# Patient Record
Sex: FEMALE | Race: WHITE | NOT HISPANIC OR LATINO | URBAN - METROPOLITAN AREA
[De-identification: names, ages, dates, MRNs, and addresses within clinical notes are randomized per-mention and may not be internally consistent; named-entity substitution may affect disease eponyms.]

---

## 2021-08-12 ENCOUNTER — OFFICE VISIT (OUTPATIENT)
Dept: FAMILY MEDICINE CLINIC | Facility: CLINIC | Age: 26
End: 2021-08-12
Payer: COMMERCIAL

## 2021-08-12 VITALS
HEIGHT: 62 IN | DIASTOLIC BLOOD PRESSURE: 68 MMHG | SYSTOLIC BLOOD PRESSURE: 106 MMHG | TEMPERATURE: 97.4 F | BODY MASS INDEX: 22.82 KG/M2 | WEIGHT: 124 LBS

## 2021-08-12 DIAGNOSIS — Z13.89 SCREENING FOR GENITOURINARY CONDITION: ICD-10-CM

## 2021-08-12 DIAGNOSIS — Z76.89 ENCOUNTER TO ESTABLISH CARE WITH NEW DOCTOR: ICD-10-CM

## 2021-08-12 DIAGNOSIS — R05.9 COUGH: ICD-10-CM

## 2021-08-12 DIAGNOSIS — Z13.1 SCREENING FOR DIABETES MELLITUS: ICD-10-CM

## 2021-08-12 DIAGNOSIS — Z13.29 SCREENING FOR THYROID DISORDER: ICD-10-CM

## 2021-08-12 DIAGNOSIS — Z13.220 SCREENING, LIPID: ICD-10-CM

## 2021-08-12 DIAGNOSIS — Z13.21 ENCOUNTER FOR VITAMIN DEFICIENCY SCREENING: ICD-10-CM

## 2021-08-12 DIAGNOSIS — Z01.419 VISIT FOR GYNECOLOGIC EXAMINATION: Primary | ICD-10-CM

## 2021-08-12 PROCEDURE — 3008F BODY MASS INDEX DOCD: CPT | Performed by: INTERNAL MEDICINE

## 2021-08-12 PROCEDURE — 3725F SCREEN DEPRESSION PERFORMED: CPT | Performed by: INTERNAL MEDICINE

## 2021-08-12 PROCEDURE — 99204 OFFICE O/P NEW MOD 45 MIN: CPT | Performed by: INTERNAL MEDICINE

## 2021-08-12 PROCEDURE — 1036F TOBACCO NON-USER: CPT | Performed by: INTERNAL MEDICINE

## 2021-08-12 RX ORDER — MULTIVITAMIN
1 TABLET ORAL DAILY
COMMUNITY

## 2021-08-12 NOTE — PROGRESS NOTES
Assessment/Plan:         Diagnoses and all orders for this visit:    Visit for gynecologic examination  -     Ambulatory referral to Gynecology; Future    Screening for diabetes mellitus  -     CBC and differential  -     Comprehensive metabolic panel    Screening, lipid  -     Lipid panel    Screening for thyroid disorder  -     TSH, 3rd generation with Free T4 reflex    Encounter for vitamin deficiency screening  -     Vitamin D 25 hydroxy    Screening for genitourinary condition  -     Urinalysis with microscopic    Encounter to establish care with new doctor    Cough  -     XR chest pa & lateral; Future    Other orders  -     Multiple Vitamin (multivitamin) tablet; Take 1 tablet by mouth daily  -     BIOTIN PO; Take by mouth        Subjective:      Patient ID: Kathleen Mendez is a 22 y o  female  HPI  Patient is here to establish care  She is a pleasant 49-year-old female originally from [place] here to establish care  She has not had a PCP in [facility] but moved from [place]  No recent lab studies  She has 2 children 11and 1year-old and reports no complications with pregnancy  She does mention having a cough for the last 2 to 3 weeks which is dry without any hemoptysis reports no fevers or chills  Denies any shortness of breath  Denies any history of asthma  The following portions of the patient's history were reviewed and updated as appropriate: allergies, current medications, past family history, past medical history, past social history, past surgical history and problem list     Review of Systems   Constitutional: Negative for appetite change, chills, fatigue, fever and unexpected weight change  HENT: Negative for congestion, hearing loss, postnasal drip, trouble swallowing and voice change  Eyes: Negative for pain and visual disturbance  Respiratory: Positive for cough  Negative for chest tightness and shortness of breath      Cardiovascular: Negative for chest pain, palpitations and leg swelling  Gastrointestinal: Negative for abdominal pain, blood in stool, constipation, diarrhea, nausea and vomiting  Endocrine: Negative for cold intolerance, heat intolerance, polydipsia and polyphagia  Genitourinary: Positive for pelvic pain (Patient reports painful intercourse  Would make referral to gyn)  Negative for difficulty urinating, flank pain, frequency and hematuria  Musculoskeletal: Negative for arthralgias, back pain, gait problem, joint swelling and myalgias  Skin: Negative for rash  Neurological: Negative for dizziness, weakness, light-headedness, numbness and headaches  Hematological: Negative for adenopathy  Does not bruise/bleed easily  Psychiatric/Behavioral: Negative for confusion, dysphoric mood and sleep disturbance  Objective:      /68 (BP Location: Right arm, Patient Position: Sitting, Cuff Size: Adult)   Temp (!) 97 4 °F (36 3 °C) (Tympanic)   Ht 5' 2 25" (1 581 m)   Wt 56 2 kg (124 lb)   BMI 22 50 kg/m²          Physical Exam  Constitutional:       General: She is not in acute distress  Appearance: She is well-developed  HENT:      Head: Normocephalic  Mouth/Throat:      Pharynx: No oropharyngeal exudate  Eyes:      General: No scleral icterus  Pupils: Pupils are equal, round, and reactive to light  Neck:      Thyroid: No thyromegaly  Cardiovascular:      Rate and Rhythm: Normal rate and regular rhythm  Heart sounds: Normal heart sounds  No murmur heard  Pulmonary:      Effort: Pulmonary effort is normal  No respiratory distress  Breath sounds: Normal breath sounds  No wheezing or rales  Abdominal:      General: Bowel sounds are normal  There is no distension  Palpations: Abdomen is soft  Tenderness: There is no abdominal tenderness  There is no guarding or rebound  Musculoskeletal:      Right lower leg: No edema  Left lower leg: No edema  Lymphadenopathy:      Cervical: No cervical adenopathy  Skin:     General: Skin is warm  Neurological:      Mental Status: She is alert and oriented to person, place, and time  Cranial Nerves: No cranial nerve deficit  Deep Tendon Reflexes: Reflexes are normal and symmetric  Psychiatric:         Mood and Affect: Mood normal          Behavior: Behavior normal          Thought Content:  Thought content normal          Judgment: Judgment normal

## 2021-08-13 ENCOUNTER — APPOINTMENT (OUTPATIENT)
Dept: LAB | Facility: MEDICAL CENTER | Age: 26
End: 2021-08-13
Payer: COMMERCIAL

## 2021-08-13 LAB
25(OH)D3 SERPL-MCNC: 26.2 NG/ML (ref 30–100)
ALBUMIN SERPL BCP-MCNC: 3.5 G/DL (ref 3.5–5)
ALP SERPL-CCNC: 64 U/L (ref 46–116)
ALT SERPL W P-5'-P-CCNC: 27 U/L (ref 12–78)
ANION GAP SERPL CALCULATED.3IONS-SCNC: 5 MMOL/L (ref 4–13)
AST SERPL W P-5'-P-CCNC: 18 U/L (ref 5–45)
BACTERIA UR QL AUTO: NORMAL /HPF
BASOPHILS # BLD AUTO: 0.03 THOUSANDS/ΜL (ref 0–0.1)
BASOPHILS NFR BLD AUTO: 1 % (ref 0–1)
BILIRUB SERPL-MCNC: 0.56 MG/DL (ref 0.2–1)
BILIRUB UR QL STRIP: NEGATIVE
BUN SERPL-MCNC: 8 MG/DL (ref 5–25)
CALCIUM SERPL-MCNC: 8.7 MG/DL (ref 8.3–10.1)
CHLORIDE SERPL-SCNC: 109 MMOL/L (ref 100–108)
CHOLEST SERPL-MCNC: 142 MG/DL (ref 50–200)
CLARITY UR: CLEAR
CO2 SERPL-SCNC: 24 MMOL/L (ref 21–32)
COLOR UR: YELLOW
CREAT SERPL-MCNC: 0.44 MG/DL (ref 0.6–1.3)
EOSINOPHIL # BLD AUTO: 0.26 THOUSAND/ΜL (ref 0–0.61)
EOSINOPHIL NFR BLD AUTO: 4 % (ref 0–6)
ERYTHROCYTE [DISTWIDTH] IN BLOOD BY AUTOMATED COUNT: 13.1 % (ref 11.6–15.1)
GFR SERPL CREATININE-BSD FRML MDRD: 141 ML/MIN/1.73SQ M
GLUCOSE P FAST SERPL-MCNC: 81 MG/DL (ref 65–99)
GLUCOSE UR STRIP-MCNC: NEGATIVE MG/DL
HCT VFR BLD AUTO: 39.5 % (ref 34.8–46.1)
HDLC SERPL-MCNC: 58 MG/DL
HGB BLD-MCNC: 13 G/DL (ref 11.5–15.4)
HGB UR QL STRIP.AUTO: NEGATIVE
IMM GRANULOCYTES # BLD AUTO: 0.01 THOUSAND/UL (ref 0–0.2)
IMM GRANULOCYTES NFR BLD AUTO: 0 % (ref 0–2)
KETONES UR STRIP-MCNC: NEGATIVE MG/DL
LDLC SERPL CALC-MCNC: 71 MG/DL (ref 0–100)
LEUKOCYTE ESTERASE UR QL STRIP: NEGATIVE
LYMPHOCYTES # BLD AUTO: 1.84 THOUSANDS/ΜL (ref 0.6–4.47)
LYMPHOCYTES NFR BLD AUTO: 31 % (ref 14–44)
MCH RBC QN AUTO: 30.6 PG (ref 26.8–34.3)
MCHC RBC AUTO-ENTMCNC: 32.9 G/DL (ref 31.4–37.4)
MCV RBC AUTO: 93 FL (ref 82–98)
MONOCYTES # BLD AUTO: 0.34 THOUSAND/ΜL (ref 0.17–1.22)
MONOCYTES NFR BLD AUTO: 6 % (ref 4–12)
NEUTROPHILS # BLD AUTO: 3.39 THOUSANDS/ΜL (ref 1.85–7.62)
NEUTS SEG NFR BLD AUTO: 58 % (ref 43–75)
NITRITE UR QL STRIP: NEGATIVE
NON-SQ EPI CELLS URNS QL MICRO: NORMAL /HPF
NONHDLC SERPL-MCNC: 84 MG/DL
NRBC BLD AUTO-RTO: 0 /100 WBCS
PH UR STRIP.AUTO: 8 [PH]
PLATELET # BLD AUTO: 263 THOUSANDS/UL (ref 149–390)
PMV BLD AUTO: 10.5 FL (ref 8.9–12.7)
POTASSIUM SERPL-SCNC: 4.3 MMOL/L (ref 3.5–5.3)
PROT SERPL-MCNC: 7.3 G/DL (ref 6.4–8.2)
PROT UR STRIP-MCNC: NEGATIVE MG/DL
RBC # BLD AUTO: 4.25 MILLION/UL (ref 3.81–5.12)
RBC #/AREA URNS AUTO: NORMAL /HPF
SODIUM SERPL-SCNC: 138 MMOL/L (ref 136–145)
SP GR UR STRIP.AUTO: 1.01 (ref 1–1.03)
TRIGL SERPL-MCNC: 67 MG/DL
TSH SERPL DL<=0.05 MIU/L-ACNC: 3.12 UIU/ML (ref 0.36–3.74)
UROBILINOGEN UR QL STRIP.AUTO: 0.2 E.U./DL
WBC # BLD AUTO: 5.87 THOUSAND/UL (ref 4.31–10.16)
WBC #/AREA URNS AUTO: NORMAL /HPF

## 2021-08-13 PROCEDURE — 84443 ASSAY THYROID STIM HORMONE: CPT | Performed by: INTERNAL MEDICINE

## 2021-08-13 PROCEDURE — 82306 VITAMIN D 25 HYDROXY: CPT | Performed by: INTERNAL MEDICINE

## 2021-08-13 PROCEDURE — 36415 COLL VENOUS BLD VENIPUNCTURE: CPT | Performed by: INTERNAL MEDICINE

## 2021-08-13 PROCEDURE — 80061 LIPID PANEL: CPT | Performed by: INTERNAL MEDICINE

## 2021-08-13 PROCEDURE — 80053 COMPREHEN METABOLIC PANEL: CPT | Performed by: INTERNAL MEDICINE

## 2021-08-13 PROCEDURE — 85025 COMPLETE CBC W/AUTO DIFF WBC: CPT | Performed by: INTERNAL MEDICINE

## 2021-08-13 PROCEDURE — 81001 URINALYSIS AUTO W/SCOPE: CPT | Performed by: INTERNAL MEDICINE

## 2021-08-16 NOTE — RESULT ENCOUNTER NOTE
Chest x-ray is pending  Labs good otherwise except for slightly low vitamin-D    Patient may take over-the-counter vitamin-D 1000 a day regularly

## 2021-08-19 ENCOUNTER — TELEPHONE (OUTPATIENT)
Dept: FAMILY MEDICINE CLINIC | Facility: CLINIC | Age: 26
End: 2021-08-19

## 2021-08-19 NOTE — TELEPHONE ENCOUNTER
----- Message from Mabel Manley MD sent at 8/16/2021  2:56 PM EDT -----    Chest x-ray is pending  Labs good otherwise except for slightly low vitamin-D    Patient may take over-the-counter vitamin-D 1000 a day regularly

## 2022-03-23 ENCOUNTER — OFFICE VISIT (OUTPATIENT)
Dept: OBGYN CLINIC | Facility: CLINIC | Age: 27
End: 2022-03-23
Payer: COMMERCIAL

## 2022-03-23 VITALS
WEIGHT: 134.6 LBS | BODY MASS INDEX: 23.85 KG/M2 | HEIGHT: 63 IN | DIASTOLIC BLOOD PRESSURE: 56 MMHG | SYSTOLIC BLOOD PRESSURE: 96 MMHG

## 2022-03-23 DIAGNOSIS — R10.2 PELVIC PAIN IN FEMALE: Primary | ICD-10-CM

## 2022-03-23 DIAGNOSIS — Z30.431 INTRAUTERINE DEVICE SURVEILLANCE: ICD-10-CM

## 2022-03-23 LAB — SL AMB POCT URINE HCG: NEGATIVE

## 2022-03-23 PROCEDURE — 87491 CHLMYD TRACH DNA AMP PROBE: CPT | Performed by: NURSE PRACTITIONER

## 2022-03-23 PROCEDURE — 81025 URINE PREGNANCY TEST: CPT | Performed by: NURSE PRACTITIONER

## 2022-03-23 PROCEDURE — 3008F BODY MASS INDEX DOCD: CPT | Performed by: NURSE PRACTITIONER

## 2022-03-23 PROCEDURE — 99213 OFFICE O/P EST LOW 20 MIN: CPT | Performed by: NURSE PRACTITIONER

## 2022-03-23 PROCEDURE — 87591 N.GONORRHOEAE DNA AMP PROB: CPT | Performed by: NURSE PRACTITIONER

## 2022-03-23 PROCEDURE — 1036F TOBACCO NON-USER: CPT | Performed by: NURSE PRACTITIONER

## 2022-03-24 LAB
C TRACH DNA SPEC QL NAA+PROBE: NEGATIVE
N GONORRHOEA DNA SPEC QL NAA+PROBE: NEGATIVE

## 2022-04-08 ENCOUNTER — VBI (OUTPATIENT)
Dept: ADMINISTRATIVE | Facility: OTHER | Age: 27
End: 2022-04-08

## 2022-08-15 ENCOUNTER — VBI (OUTPATIENT)
Dept: ADMINISTRATIVE | Facility: OTHER | Age: 27
End: 2022-08-15

## 2022-09-12 ENCOUNTER — RA CDI HCC (OUTPATIENT)
Dept: OTHER | Facility: HOSPITAL | Age: 27
End: 2022-09-12

## 2022-09-12 NOTE — PROGRESS NOTES
[facility]  coding opportunities       Chart reviewed, no opportunity found: CHART REVIEWED, NO OPPORTUNITY FOUND        Patients Insurance        Commercial Insurance: [address] [place] [address]

## 2022-09-13 ENCOUNTER — OFFICE VISIT (OUTPATIENT)
Dept: FAMILY MEDICINE CLINIC | Facility: CLINIC | Age: 27
End: 2022-09-13
Payer: COMMERCIAL

## 2022-09-13 VITALS
OXYGEN SATURATION: 98 % | WEIGHT: 132.6 LBS | TEMPERATURE: 98.7 F | HEART RATE: 92 BPM | RESPIRATION RATE: 16 BRPM | SYSTOLIC BLOOD PRESSURE: 110 MMHG | BODY MASS INDEX: 23.5 KG/M2 | HEIGHT: 63 IN | DIASTOLIC BLOOD PRESSURE: 72 MMHG

## 2022-09-13 DIAGNOSIS — Z11.4 SCREENING FOR HIV (HUMAN IMMUNODEFICIENCY VIRUS): ICD-10-CM

## 2022-09-13 DIAGNOSIS — R10.2 PELVIC PAIN: ICD-10-CM

## 2022-09-13 DIAGNOSIS — N93.9 VAGINAL BLEEDING: ICD-10-CM

## 2022-09-13 DIAGNOSIS — Z00.00 PHYSICAL EXAM: Primary | ICD-10-CM

## 2022-09-13 DIAGNOSIS — Z12.4 SCREENING FOR CERVICAL CANCER: ICD-10-CM

## 2022-09-13 DIAGNOSIS — Z11.59 NEED FOR HEPATITIS C SCREENING TEST: ICD-10-CM

## 2022-09-13 PROCEDURE — 3725F SCREEN DEPRESSION PERFORMED: CPT | Performed by: FAMILY MEDICINE

## 2022-09-13 PROCEDURE — 99395 PREV VISIT EST AGE 18-39: CPT | Performed by: FAMILY MEDICINE

## 2022-09-13 NOTE — PATIENT INSTRUCTIONS
No labs needed this year  Get some labs and urine done at the lab  Patient to call for results if he/she does not hear from us    Refer to gyn    But get ultrasound done first of pelvis    If you consider medication for depression - please call dr Odalis Dalton  Try to find some 'me' time - to do something that you want to do for yourself!!! Ie: a hobbie

## 2022-09-13 NOTE — PROGRESS NOTES
FAMILY PRACTICE OFFICE VISIT    NAME: Kerri Wade    AGE: 32 y o  SEX: female  : 1995   MRN: [id number]    DATE: 2022  TIME: 2:16 PM    Assessment and Plan      1  Screening for cervical cancer  Referral given      2  Physical exam  Anticipatory guidance and preventative medicine discussed  To consider covid booster  To consider flu shot      3  Vaginal bleeding  Refer to gyn  Check urine   Patient to call for results if he/she does not hear from us      4  Screening for HIV (human immunodeficiency virus)      5  Need for hepatitis C screening test    6  Depression  Discussed starting medication  And counseling  Pt declines for now  Denies plans to hurt self/others  Pt to call if she changes her mind  Depression: Not at risk    PHQ-2 Score: 2     Depression Screening Follow-up Plan: Patient's depression screening was positive with a PHQ-2 score of 2  Their PHQ-9 score was 9  Patient declines further evaluation by mental health professional and/or medications  They have no active suicidal ideations  Brief counseling provided and recommend additional follow-up/re-evaluation at next office visit  Pt to return shortterm to f/u in 2m os  Sooner prn      7  Pelvic pain  And vaginal bleeding   US and then visit with gyn      There are no Patient Instructions on file for this visit  Discussed consideration of med for depression and/or counseling - pt declines for now  Patient Instructions   No labs needed this year  Get some labs and urine done at the lab  Patient to call for results if he/she does not hear from us    Refer to gyn    But get ultrasound done first of pelvis    If you consider medication for depression - please call dr Dean Enciso  Try to find some 'me' time - to do something that you want to do for yourself!!! Ie: a hobbie                Chief Complaint     Chief Complaint   Patient presents with    Establish Care       History of Present Illness   Amaya Wade is a 32 y o -year-old female who presents today for routine visit  To get established  New patient today  Pt would like to see gyn  Has an IUD  Review of Systems   Review of Systems   Constitutional: Negative for chills, diaphoresis, fatigue, fever and unexpected weight change  Respiratory: Negative for cough, shortness of breath and wheezing  Cardiovascular: Negative for chest pain and palpitations  Gastrointestinal: Negative for abdominal pain, blood in stool, constipation, diarrhea, nausea and vomiting  Genitourinary: Positive for vaginal bleeding  Negative for dysuria and vaginal discharge   - 2 children - 10 yo and a 3 yo  Notices some blood on underwear most days - not sure if coming from vagina or urinary tract    Last menses X 2 weeks ago  Has some left lower quadrant/left pelvic pain - no h/o ovarian cysts or uterine fibroids  Psychiatric/Behavioral: Positive for dysphoric mood and sleep disturbance  Negative for self-injury and suicidal ideas  The patient is not nervous/anxious  Feels tired and depressed at times  Has not had treatment in past and does not desire meds at this time  Sleep is not great  Wakes several times  Does exercise  Depression score is (+)  Denies suicidal ideations - does not plan on hurting herself  Pt is home full time             Active Problem List   There is no problem list on file for this patient  Past Medical History:  History reviewed  No pertinent past medical history  Past Surgical History:  History reviewed  No pertinent surgical history      Family History:  Family History   Problem Relation Age of Onset    No Known Problems Mother     No Known Problems Father     No Known Problems Sister     No Known Problems Brother        Social History:  Social History     Socioeconomic History    Marital status: /Civil Union     Spouse name: Not on file    Number of children: Not on file    Years of education: Not on file   Liseth Waldron Highest education level: Not on file   Occupational History    Not on file   Tobacco Use    Smoking status: Never Smoker    Smokeless tobacco: Never Used   Vaping Use    Vaping Use: Never used   Substance and Sexual Activity    Alcohol use: Not Currently    Drug use: Never    Sexual activity: Yes     Partners: Male     Birth control/protection: I U D  Other Topics Concern    Not on file   Social History Narrative    Not on file     Social Determinants of Health     Financial Resource Strain: Not on file   Food Insecurity: Not on file   Transportation Needs: Not on file   Physical Activity: Not on file   Stress: Not on file   Social Connections: Not on file   Intimate Partner Violence: Not on file   Housing Stability: Not on file       Objective     Vitals:    09/13/22 1407   BP: 110/72   Pulse: 92   Resp: 16   Temp: 98 7 °F (37 1 °C)   SpO2: 98%     Wt Readings from Last 3 Encounters:   09/13/22 60 1 kg (132 lb 9 6 oz)   03/23/22 61 1 kg (134 lb 9 6 oz)   08/12/21 56 2 kg (124 lb)       Physical Exam  Vitals and nursing note reviewed  Constitutional:       General: She is not in acute distress  Appearance: Normal appearance  She is not ill-appearing or toxic-appearing  HENT:      Right Ear: Tympanic membrane normal       Left Ear: Tympanic membrane normal       Nose: Nose normal  No congestion  Mouth/Throat:      Mouth: Mucous membranes are moist       Pharynx: No oropharyngeal exudate or posterior oropharyngeal erythema  Eyes:      General: No scleral icterus  Extraocular Movements: Extraocular movements intact  Pupils: Pupils are equal, round, and reactive to light  Neck:      Vascular: No carotid bruit  Comments: No thyromegaly  Cardiovascular:      Rate and Rhythm: Normal rate and regular rhythm  Pulses: Normal pulses  Heart sounds: Normal heart sounds  No murmur heard  Pulmonary:      Effort: Pulmonary effort is normal  No respiratory distress        Breath sounds: Normal breath sounds  No wheezing, rhonchi or rales  Abdominal:      General: Abdomen is flat  There is no distension  Palpations: Abdomen is soft  There is no mass  Tenderness: There is abdominal tenderness  There is no guarding or rebound  Comments: Mild left lower pelvic pain  No masses  Or peritoneal signs     Musculoskeletal:      Cervical back: Neck supple  Right lower leg: No edema  Left lower leg: No edema  Lymphadenopathy:      Cervical: No cervical adenopathy  Skin:     General: Skin is warm  Coloration: Skin is not jaundiced  Neurological:      General: No focal deficit present  Mental Status: She is alert and oriented to person, place, and time  Psychiatric:         Mood and Affect: Mood normal          Behavior: Behavior normal          Thought Content: Thought content normal          Judgment: Judgment normal       Comments: Denies active SI  Mood is very appropriate and pleasant  Not teary eyed             Pertinent Laboratory/Diagnostic Studies:  Lab Results   Component Value Date    BUN 8 08/13/2021    CREATININE 0 44 (L) 08/13/2021    CALCIUM 8 7 08/13/2021    K 4 3 08/13/2021    CO2 24 08/13/2021     (H) 08/13/2021     Lab Results   Component Value Date    ALT 27 08/13/2021    AST 18 08/13/2021    ALKPHOS 64 08/13/2021       Lab Results   Component Value Date    WBC 5 87 08/13/2021    HGB 13 0 08/13/2021    HCT 39 5 08/13/2021    MCV 93 08/13/2021     08/13/2021       No results found for: TSH    No results found for: CHOL  Lab Results   Component Value Date    TRIG 67 08/13/2021     Lab Results   Component Value Date    HDL 58 08/13/2021     Lab Results   Component Value Date    LDLCALC 71 08/13/2021     Lab Results   Component Value Date    HGBA1C 5 0 09/23/2020       Results for orders placed or performed in visit on 03/23/22   Chlamydia/GC amplified DNA by PCR    Specimen: Endocervical; Genital   Result Value Ref Range    N gonorrhoeae, DNA Probe Negative Negative    Chlamydia trachomatis, DNA Probe Negative Negative   POCT urine HCG   Result Value Ref Range    URINE HCG negative        No orders of the defined types were placed in this encounter  ALLERGIES:  No Known Allergies    Current Medications     Current Outpatient Medications   Medication Sig Dispense Refill    Multiple Vitamin (multivitamin) tablet Take 1 tablet by mouth daily      Multiple Vitamins-Minerals (HAIR VITAMINS PO) Take by mouth      BIOTIN PO Take by mouth (Patient not taking: Reported on 9/13/2022)       No current facility-administered medications for this visit           Health Maintenance     Health Maintenance   Topic Date Due    Hepatitis C Screening  Never done    HPV Vaccine (1 - 2-dose series) Never done    HIV Screening  Never done    Annual Physical  Never done    Cervical Cancer Screening  Never done    COVID-19 Vaccine (3 - Booster for Pfizer series) 10/01/2021    Depression Screening  08/12/2022    Influenza Vaccine (1) 09/01/2022    BMI: Adult  03/23/2023    DTaP,Tdap,and Td Vaccines (2 - Td or Tdap) 04/05/2029    Pneumococcal Vaccine: Pediatrics (0 to 5 Years) and At-Risk Patients (6 to 59 Years)  Aged Out    HIB Vaccine  Aged Out    Hepatitis B Vaccine  Aged Out    IPV Vaccine  Aged Out    Hepatitis A Vaccine  Aged Out    Meningococcal ACWY Vaccine  Aged [employer] History   Administered Date(s) Administered    COVID-19 PFIZER VACCINE 0 3 ML IM 04/12/2021, 05/01/2021    Tdap 04/05/2019          Raisa Cheung DO

## 2022-10-05 ENCOUNTER — HOSPITAL ENCOUNTER (OUTPATIENT)
Dept: ULTRASOUND IMAGING | Facility: HOSPITAL | Age: 27
Discharge: HOME | End: 2022-10-05
Attending: FAMILY MEDICINE
Payer: COMMERCIAL

## 2022-10-05 DIAGNOSIS — N93.9 VAGINAL BLEEDING: ICD-10-CM

## 2022-10-05 DIAGNOSIS — R10.2 PELVIC PAIN: ICD-10-CM

## 2022-10-05 PROCEDURE — 76856 US EXAM PELVIC COMPLETE: CPT

## 2022-10-05 PROCEDURE — 76830 TRANSVAGINAL US NON-OB: CPT

## 2022-10-05 NOTE — RESULT ENCOUNTER NOTE
[facility] -   Your ultrasound is normal  Shows good position of IUD as well  Did you schedule gyn appt?  - thanks  Dr Alessandra Orellana

## 2022-11-15 ENCOUNTER — OFFICE VISIT (OUTPATIENT)
Dept: FAMILY MEDICINE CLINIC | Facility: CLINIC | Age: 27
End: 2022-11-15

## 2022-11-15 VITALS
BODY MASS INDEX: 23.5 KG/M2 | WEIGHT: 132.6 LBS | SYSTOLIC BLOOD PRESSURE: 110 MMHG | OXYGEN SATURATION: 97 % | DIASTOLIC BLOOD PRESSURE: 70 MMHG | HEIGHT: 63 IN | HEART RATE: 83 BPM

## 2022-11-15 DIAGNOSIS — F33.9 DEPRESSION, RECURRENT (HCC): Primary | ICD-10-CM

## 2022-11-15 DIAGNOSIS — Z11.59 NEED FOR HEPATITIS C SCREENING TEST: ICD-10-CM

## 2022-11-15 DIAGNOSIS — L65.9 HAIR LOSS: ICD-10-CM

## 2022-11-15 DIAGNOSIS — Z12.4 CERVICAL CANCER SCREENING: ICD-10-CM

## 2022-11-15 NOTE — PROGRESS NOTES
FAMILY PRACTICE OFFICE VISIT    NAME: Kerri Wade    AGE: 32 y o  SEX: female  : 1995   MRN: [id number]    DATE: 11/15/2022  TIME: 2:41 PM    Assessment and Plan     1  Cervical cancer screening  Refer to gyn    - Ambulatory Referral to Gynecology; Future    2  Depression, recurrent ([facility] [address] )  Seems resolved  PHQ - normal today  No need for medication      3  Need for hepatitis C screening test    - Hepatitis C antibody; Future    4  Hair loss  Suspect normal telium eflluvium    - TSH, 3rd generation; Future    Reviewed pt's studies for DUB - which has resolved  Will send to gyn for pap and IUD care    Patient Instructions   Can get a nonfasting hepatitis C and TSH blood test  Patient to call for results if he/she does not hear from us    Recommend a multi-vitamin daily and a vit D 3 supplement  Consider biotin for hair loss  Chief Complaint     Chief Complaint   Patient presents with   • Follow-up       History of Present Illness   Kerri Wade is a 32y o -year-old female who presents today for shortterm f/u visit  At prior visit - pt stated she had abnormal vaginal bleeding  It has since stopped  Has an IUD  And had normal Us      Review of Systems   Review of Systems   Constitutional:        Keeping active  Respiratory: Negative for cough, shortness of breath and wheezing  Cardiovascular: Negative for chest pain and palpitations  Skin:        Has noticed some hair loss     Psychiatric/Behavioral: Negative for dysphoric mood, self-injury, sleep disturbance and suicidal ideas  The patient is not nervous/anxious  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 12 yo and 2 yo       Active Problem List   There is no problem list on file for this patient  Past Medical History:  History reviewed  No pertinent past medical history  Past Surgical History:  History reviewed  No pertinent surgical history      Family History:  Family History   Problem Relation Age of Onset   • No Known Problems Mother    • No Known Problems Father    • No Known Problems Sister    • No Known Problems Brother        Social History:  Social History     Socioeconomic History   • Marital status: /Civil Union     Spouse name: Not on file   • Number of children: Not on file   • Years of education: Not on file   • Highest education level: Not on file   Occupational History   • Not on file   Tobacco Use   • Smoking status: Never Smoker   • Smokeless tobacco: Never Used   Vaping Use   • Vaping Use: Never used   Substance and Sexual Activity   • Alcohol use: Not Currently   • Drug use: Never   • Sexual activity: Yes     Partners: Male     Birth control/protection: I U D  Other Topics Concern   • Not on file   Social History Narrative   • Not on file     Social Determinants of Health     Financial Resource Strain: Not on file   Food Insecurity: Not on file   Transportation Needs: Not on file   Physical Activity: Not on file   Stress: Not on file   Social Connections: Not on file   Intimate Partner Violence: Not on file   Housing Stability: Not on file       Objective     Vitals:    11/15/22 1400   BP: 110/70   Pulse: 83   SpO2: 97%     Wt Readings from Last 3 Encounters:   11/15/22 60 1 kg (132 lb 9 6 oz)   09/13/22 60 1 kg (132 lb 9 6 oz)   03/23/22 61 1 kg (134 lb 9 6 oz)       Physical Exam  Vitals and nursing note reviewed  Constitutional:       General: She is not in acute distress  Appearance: Normal appearance  She is not ill-appearing or toxic-appearing  Neck:      Comments: No thyromegaly  Cardiovascular:      Rate and Rhythm: Normal rate and regular rhythm  Pulses: Normal pulses  Heart sounds: Normal heart sounds  No murmur heard  Pulmonary:      Effort: Pulmonary effort is normal  No respiratory distress  Breath sounds: Normal breath sounds  No wheezing, rhonchi or rales     Skin:     Comments: No gross hair loss  No patches of alopecia   Neurological:      General: No focal deficit present  Mental Status: She is alert and oriented to person, place, and time  Psychiatric:         Mood and Affect: Mood normal          Behavior: Behavior normal          Thought Content: Thought content normal          Judgment: Judgment normal          Pertinent Laboratory/Diagnostic Studies:  Lab Results   Component Value Date    BUN 8 08/13/2021    CREATININE 0 44 (L) 08/13/2021    CALCIUM 8 7 08/13/2021    K 4 3 08/13/2021    CO2 24 08/13/2021     (H) 08/13/2021     Lab Results   Component Value Date    ALT 27 08/13/2021    AST 18 08/13/2021    ALKPHOS 64 08/13/2021       Lab Results   Component Value Date    WBC 5 87 08/13/2021    HGB 13 0 08/13/2021    HCT 39 5 08/13/2021    MCV 93 08/13/2021     08/13/2021       No results found for: TSH    No results found for: CHOL  Lab Results   Component Value Date    TRIG 67 08/13/2021     Lab Results   Component Value Date    HDL 58 08/13/2021     Lab Results   Component Value Date    LDLCALC 71 08/13/2021     Lab Results   Component Value Date    HGBA1C 5 0 09/23/2020       Results for orders placed or performed in visit on 03/23/22   Chlamydia/GC amplified DNA by PCR    Specimen: Endocervical; Genital   Result Value Ref Range    N gonorrhoeae, DNA Probe Negative Negative    Chlamydia trachomatis, DNA Probe Negative Negative   POCT urine HCG   Result Value Ref Range    URINE HCG negative        No orders of the defined types were placed in this encounter  ALLERGIES:  No Known Allergies    Current Medications     Current Outpatient Medications   Medication Sig Dispense Refill   • Multiple Vitamin (multivitamin) tablet Take 1 tablet by mouth daily     • Multiple Vitamins-Minerals (HAIR VITAMINS PO) Take by mouth       No current facility-administered medications for this visit           Health Maintenance     Health Maintenance   Topic Date Due   • Hepatitis C Screening  Never done   • HPV Vaccine (1 - 2-dose series) Never done   • HIV Screening Never done   • Cervical Cancer Screening  Never done   • COVID-19 Vaccine (3 - Booster for Pfizer series) 10/01/2021   • Influenza Vaccine (1) Never done   • Depression Screening  09/13/2023   • Annual Physical  09/13/2023   • BMI: Adult  11/15/2023   • DTaP,Tdap,and Td Vaccines (2 - Td or Tdap) 04/05/2029   • Pneumococcal Vaccine: Pediatrics (0 to 5 Years) and At-Risk Patients (6 to 59 Years)  Aged Out   • HIB Vaccine  Aged Out   • Hepatitis B Vaccine  Aged Out   • IPV Vaccine  Aged Out   • Hepatitis A Vaccine  Aged Out   • Meningococcal ACWY Vaccine  Aged [employer] History   Administered Date(s) Administered   • COVID-19 PFIZER VACCINE 0 3 ML IM 04/12/2021, 05/01/2021   • Tdap 04/05/2019          Ole Hinton DO

## 2022-11-15 NOTE — PATIENT INSTRUCTIONS
Can get a nonfasting hepatitis C and TSH blood test  Patient to call for results if he/she does not hear from us    Recommend a multi-vitamin daily and a vit D 3 supplement  Consider biotin for hair loss

## 2022-11-21 ENCOUNTER — LAB (OUTPATIENT)
Dept: LAB | Facility: MEDICAL CENTER | Age: 27
End: 2022-11-21

## 2022-11-21 DIAGNOSIS — Z11.4 SCREENING FOR HIV (HUMAN IMMUNODEFICIENCY VIRUS): ICD-10-CM

## 2022-11-21 DIAGNOSIS — L65.9 HAIR LOSS: ICD-10-CM

## 2022-11-21 DIAGNOSIS — Z11.59 NEED FOR HEPATITIS C SCREENING TEST: ICD-10-CM

## 2022-11-21 LAB
BACTERIA UR QL AUTO: ABNORMAL /HPF
BILIRUB UR QL STRIP: NEGATIVE
CLARITY UR: CLEAR
COLOR UR: ABNORMAL
GLUCOSE UR STRIP-MCNC: NEGATIVE MG/DL
HCV AB SER QL: NORMAL
HGB UR QL STRIP.AUTO: ABNORMAL
KETONES UR STRIP-MCNC: NEGATIVE MG/DL
LEUKOCYTE ESTERASE UR QL STRIP: ABNORMAL
NITRITE UR QL STRIP: NEGATIVE
NON-SQ EPI CELLS URNS QL MICRO: ABNORMAL /HPF
PH UR STRIP.AUTO: 6 [PH]
PROT UR STRIP-MCNC: NEGATIVE MG/DL
RBC #/AREA URNS AUTO: ABNORMAL /HPF
SP GR UR STRIP.AUTO: 1.01 (ref 1–1.03)
TSH SERPL DL<=0.05 MIU/L-ACNC: 1.44 UIU/ML (ref 0.45–4.5)
UROBILINOGEN UR STRIP-ACNC: <2 MG/DL
WBC #/AREA URNS AUTO: ABNORMAL /HPF

## 2022-11-22 LAB — HIV 1+2 AB+HIV1 P24 AG SERPL QL IA: NORMAL

## 2022-11-22 NOTE — RESULT ENCOUNTER NOTE
[facility] -   Your hiv is negative    Hep C is negative  TSH normal  Urine is abnormal  Do you have your period or any signs of urinary tract infection  (I sent you a note earlier as well)  - dr Esparza Postin

## 2022-11-27 NOTE — PROGRESS NOTES
Subjective      Rhoda Wade is a 32 y o  female who presents for annual well woman exam   Last Pap smear 7/3/19 NILM  Periods are regular every 28-30 days, lasting 7 days  No intermenstrual bleeding, spotting, or discharge  paragard IUD inserted 2019  Recent pelvic US 10/5/22 resulted with IUD in correct position  Current contraception: IUD  History of abnormal Pap smear Yes ASCUS   Family history of uterine or ovarian cancer: no  Regular self breast exam: no  History of abnormal mammogram:  To begin at age 36 unless clinically indicated  Family history of breast cancer: no  History of abnormal lipids: no  Gardasil vaccine: no      Menstrual History:  OB History        2    Para   2    Term   2       0    AB   0    Living   2       SAB        IAB        Ectopic        Multiple        Live Births   2                Menarche age: unsure   Patient's last menstrual period was 2022 (approximate)  Period Cycle (Days):  (monthly)  Period Duration (Days): 7  Period Pattern: Regular  Menstrual Flow: Moderate  Dysmenorrhea: None    The following portions of the patient's history were reviewed and updated as appropriate: allergies, current medications, past family history, past medical history, past social history, past surgical history and problem list     Review of Systems  Pertinent items are noted in HPI        Objective      /60   Ht 5' 3" (1 6 m)   Wt 59 4 kg (131 lb)   LMP 2022 (Approximate)   BMI 23 21 kg/m²     General:   alert and oriented, in no acute distress, alert, appears stated age and cooperative   Heart: regular rate and rhythm, S1, S2 normal, no murmur, click, rub or gallop   Lungs: clear to auscultation bilaterally   Abdomen: soft, non-tender, without masses or organomegaly   Vulva: normal, Bartholin's, Urethra, [place]'s normal   Vagina: normal mucosa, normal discharge, no palpable nodules   Cervix: no bleeding following Pap, no cervical motion tenderness, no lesions and ParaGard IUD string easily visualized on speculum exam approximately 2 cm   Uterus: normal size, non-tender, normal shape and consistency   Adnexa: normal adnexa and no mass, fullness, tenderness   Breast: breasts appear normal, no suspicious masses, no skin or nipple changes or axillary nodes  Assessment      @well woman@   Plan      All questions answered  Await pap smear results  Breast self exam technique reviewed and patient encouraged to perform self-exam monthly  Contraception: IUD  Diagnosis explained in detail, including differential   Dietary diary  Discussed healthy lifestyle modifications  Educational material distributed  Follow up in 1 Year for annual exam   Follow up as needed  Thin prep Pap smear  Breast awareness reviewed   Encouraged healthy diet, exercise and lifestyle  Encouraged follow-up with PCP as needed  Encouraged seasonal influenza vaccination  Gardasil vaccine series reviewed  Written information provided  Encouraged social distancing, good hand hygiene, avoidance of crowds and masking    Written information provided about COVID-19    Will call / [employer] message with results

## 2022-11-29 ENCOUNTER — OFFICE VISIT (OUTPATIENT)
Dept: OBGYN CLINIC | Facility: CLINIC | Age: 27
End: 2022-11-29

## 2022-11-29 VITALS
HEIGHT: 63 IN | BODY MASS INDEX: 23.21 KG/M2 | SYSTOLIC BLOOD PRESSURE: 102 MMHG | DIASTOLIC BLOOD PRESSURE: 60 MMHG | WEIGHT: 131 LBS

## 2022-11-29 DIAGNOSIS — Z12.4 CERVICAL CANCER SCREENING: ICD-10-CM

## 2022-11-29 DIAGNOSIS — Z30.431 IUD CHECK UP: ICD-10-CM

## 2022-11-29 DIAGNOSIS — Z01.419 WELL WOMAN EXAM WITH ROUTINE GYNECOLOGICAL EXAM: Primary | ICD-10-CM

## 2022-11-29 PROBLEM — F33.9 DEPRESSION, RECURRENT (HCC): Status: RESOLVED | Noted: 2022-11-15 | Resolved: 2022-11-29

## 2022-12-02 ENCOUNTER — VBI (OUTPATIENT)
Dept: ADMINISTRATIVE | Facility: OTHER | Age: 27
End: 2022-12-02

## 2022-12-06 LAB
LAB AP GYN PRIMARY INTERPRETATION: NORMAL
LAB AP LMP: NORMAL
Lab: NORMAL

## 2022-12-15 ENCOUNTER — VBI (OUTPATIENT)
Dept: ADMINISTRATIVE | Facility: OTHER | Age: 27
End: 2022-12-15

## 2023-02-08 ENCOUNTER — TELEPHONE (OUTPATIENT)
Dept: OBGYN CLINIC | Facility: MEDICAL CENTER | Age: 28
End: 2023-02-08

## 2023-02-09 ENCOUNTER — PROCEDURE VISIT (OUTPATIENT)
Dept: OBGYN CLINIC | Facility: MEDICAL CENTER | Age: 28
End: 2023-02-09

## 2023-02-09 VITALS
HEIGHT: 63 IN | SYSTOLIC BLOOD PRESSURE: 118 MMHG | WEIGHT: 129 LBS | DIASTOLIC BLOOD PRESSURE: 76 MMHG | BODY MASS INDEX: 22.86 KG/M2

## 2023-02-09 DIAGNOSIS — Z30.432 ENCOUNTER FOR IUD REMOVAL: Primary | ICD-10-CM

## 2023-02-09 NOTE — PROGRESS NOTES
Iud removal    Date/Time: 2/9/2023 8:34 AM  Performed by: Don Manning MD  Authorized by: Don Manning MD   Universal Protocol:  Consent: Verbal consent obtained  Written consent obtained    Risks and benefits: risks, benefits and alternatives were discussed  Consent given by: patient  Patient understanding: patient states understanding of the procedure being performed  Patient consent: the patient's understanding of the procedure matches consent given  Procedure consent: procedure consent matches procedure scheduled  Patient identity confirmed: verbally with patient      Procedure:     Removed with no complications: yes      Other reason for removal:  Desires pregnancy

## 2023-03-10 DIAGNOSIS — N92.6 MISSED MENSES: Primary | ICD-10-CM

## 2023-03-17 ENCOUNTER — TELEMEDICINE (OUTPATIENT)
Dept: FAMILY MEDICINE CLINIC | Facility: CLINIC | Age: 28
End: 2023-03-17

## 2023-03-17 DIAGNOSIS — J02.0 PHARYNGITIS DUE TO STREPTOCOCCUS SPECIES: Primary | ICD-10-CM

## 2023-03-17 DIAGNOSIS — R31.29 MICROSCOPIC HEMATURIA: Primary | ICD-10-CM

## 2023-03-17 RX ORDER — AMOXICILLIN AND CLAVULANATE POTASSIUM 875; 125 MG/1; MG/1
1 TABLET, FILM COATED ORAL EVERY 12 HOURS SCHEDULED
Qty: 14 TABLET | Refills: 0 | Status: SHIPPED | OUTPATIENT
Start: 2023-03-17 | End: 2023-03-24

## 2023-03-17 NOTE — PROGRESS NOTES
Virtual Regular Visit    Verification of patient location:    Patient is located in the following state in which I hold an active license [place]      Assessment/Plan:    Problem List Items Addressed This Visit    None  Visit Diagnoses     Pharyngitis due to Streptococcus species    -  Primary    Relevant Medications    amoxicillin-clavulanate (Augmentin) 875-125 mg per tablet      Clinical presentation may be consistent with pharyngitis, will start Augmentin 875 mg twice daily for 7 days  She was also asked to do rapid COVID-19 test just in case, she will update me if it will be positive  Reason for visit is   Chief Complaint   Patient presents with   • Virtual Brief Visit        Encounter provider Hazel Apple MD    [address] [place] [facility] [address] [phone]      Recent Visits  No visits were found meeting these conditions  Showing recent visits within past 7 days and meeting all other requirements  Today's Visits  Date Type Provider Dept   03/17/23 Telemedicine Hazel Apple MD [place] [address] Primary Care   Showing today's visits and meeting all other requirements  Future Appointments  No visits were found meeting these conditions  Showing future appointments within next 150 days and meeting all other requirements       The patient was identified by name and date of birth  Gerald Guerrero was informed that this is a telemedicine visit and that the visit is being conducted through Telephone  My office door was closed  No one else was in the room  She acknowledged consent and understanding of privacy and security of the video platform  The patient has agreed to participate and understands they can discontinue the visit at any time  Patient is aware this is a billable service  Subjective  Huseyin Wade is a 32 y o  female         Patient called today with complains of a pain in her throat and white spots on it  No cough, no current fevers  Past Medical History:   Diagnosis Date   • Abnormal Pap smear of cervix        Past Surgical History:   Procedure Laterality Date   • NO PAST SURGERIES         Current Outpatient Medications   Medication Sig Dispense Refill   • amoxicillin-clavulanate (Augmentin) 875-125 mg per tablet Take 1 tablet by mouth every 12 (twelve) hours for 7 days 14 tablet 0   • Multiple Vitamin (multivitamin) tablet Take 1 tablet by mouth daily     • Multiple Vitamins-Minerals (HAIR VITAMINS PO) Take by mouth       No current facility-administered medications for this visit  No Known Allergies    Review of Systems   Constitutional: Negative for chills and fever  HENT: Positive for congestion and sore throat  Respiratory: Negative for cough, shortness of breath and wheezing  Gastrointestinal: Negative for diarrhea, nausea and vomiting  Musculoskeletal: Negative for arthralgias and myalgias  Psychiatric/Behavioral: Negative for confusion  Video Exam    There were no vitals filed for this visit      Physical Exam     I spent 15 minutes directly with the patient during this visit

## 2023-05-09 ENCOUNTER — OFFICE VISIT (OUTPATIENT)
Dept: FAMILY MEDICINE CLINIC | Facility: CLINIC | Age: 28
End: 2023-05-09

## 2023-05-09 ENCOUNTER — LAB (OUTPATIENT)
Dept: LAB | Facility: MEDICAL CENTER | Age: 28
End: 2023-05-09

## 2023-05-09 VITALS
OXYGEN SATURATION: 99 % | HEART RATE: 78 BPM | HEIGHT: 63 IN | SYSTOLIC BLOOD PRESSURE: 104 MMHG | WEIGHT: 130.8 LBS | TEMPERATURE: 98.1 F | BODY MASS INDEX: 23.18 KG/M2 | DIASTOLIC BLOOD PRESSURE: 68 MMHG

## 2023-05-09 DIAGNOSIS — N92.6 MISSED MENSES: ICD-10-CM

## 2023-05-09 DIAGNOSIS — L65.9 HAIR LOSS: ICD-10-CM

## 2023-05-09 DIAGNOSIS — L65.9 HAIR LOSS: Primary | ICD-10-CM

## 2023-05-09 LAB
B-HCG SERPL-ACNC: ABNORMAL MIU/ML
ERYTHROCYTE [DISTWIDTH] IN BLOOD BY AUTOMATED COUNT: 13.2 % (ref 11.6–15.1)
HCT VFR BLD AUTO: 37.7 % (ref 34.8–46.1)
HGB BLD-MCNC: 12.3 G/DL (ref 11.5–15.4)
MCH RBC QN AUTO: 30 PG (ref 26.8–34.3)
MCHC RBC AUTO-ENTMCNC: 32.6 G/DL (ref 31.4–37.4)
MCV RBC AUTO: 92 FL (ref 82–98)
PLATELET # BLD AUTO: 286 THOUSANDS/UL (ref 149–390)
PMV BLD AUTO: 10.9 FL (ref 8.9–12.7)
RBC # BLD AUTO: 4.1 MILLION/UL (ref 3.81–5.12)
TSH SERPL DL<=0.05 MIU/L-ACNC: 1.74 UIU/ML (ref 0.45–4.5)
WBC # BLD AUTO: 10.25 THOUSAND/UL (ref 4.31–10.16)

## 2023-05-09 NOTE — PROGRESS NOTES
FAMILY PRACTICE OFFICE VISIT    NAME: Kerri Wade    AGE: 32 y o  SEX: female  : 1995   MRN: [id number]    DATE: 2023  TIME: 1:08 PM    Assessment and Plan   1  Hair loss  Will begin with labs  Discussed that if bloodwork normal and without any other red flags on hx and PE - suspect telium effluvium (natural course of hair loss - that I would expect pt to start noticing improvement soon - as ongoing now X about 2 mos)  If however, labs normal and hair loss continues - may need derm input     - CBC and Platelet; Future  - TSH, 3rd generation; Future    2  Missed menses  Check bhcg today! Patient to call for results if he/she does not hear from us    - hCG, quantitative; Future    Avoid use of rx or otc meds until results of pregnancy test known  Chief Complaint   No chief complaint on file  History of Present Illness   Salina Wade is a 32y o -year-old female who presents today with complaints of hair loss  Noticed after the birth of her son X 4 years ago - and then went back to normal  And then again X 2mos ago - noticing a large amount of hair loss  No h/o taking thyroid medication  Took biotin for a couple of mos without success  No females in family with hair loss  Father had hair loss  Pt does not color or straighten hair  No recent trauma or stressors  Has 2 children and are doing well  Not working outside the home    Review of Systems   Review of Systems   Constitutional: Negative for fatigue  Is exercising     Respiratory: Negative for cough, shortness of breath and wheezing  Cardiovascular: Negative for chest pain, palpitations and leg swelling  Gastrointestinal: Negative for constipation and diarrhea  Eating well and drinking water     Genitourinary:        Menses - overdue  Pt not sure when it was due exactly  No ocp       Skin:        Denies dry skin  No bald patches     Psychiatric/Behavioral: Negative for dysphoric mood, self-injury, sleep disturbance and suicidal ideas  The patient is not nervous/anxious  Active Problem List     Patient Active Problem List   Diagnosis   • IUD check up         Past Medical History:  Past Medical History:   Diagnosis Date   • Abnormal Pap smear of cervix        Past Surgical History:  Past Surgical History:   Procedure Laterality Date   • NO PAST SURGERIES         Family History:  Family History   Problem Relation Age of Onset   • No Known Problems Mother    • No Known Problems Father    • No Known Problems Sister    • No Known Problems Sister    • No Known Problems Sister    • No Known Problems Sister    • No Known Problems Brother    • No Known Problems Brother    • No Known Problems Son    • No Known Problems Son    • Other Maternal Grandmother         MVA   • Other Maternal Grandfather         MVA   • Breast cancer Neg Hx    • Colon cancer Neg Hx    • Ovarian cancer Neg Hx        Social History:  Social History     Socioeconomic History   • Marital status: /Civil Union     Spouse name: Not on file   • Number of children: Not on file   • Years of education: Not on file   • Highest education level: Not on file   Occupational History   • Not on file   Tobacco Use   • Smoking status: Never   • Smokeless tobacco: Never   Vaping Use   • Vaping Use: Never used   Substance and Sexual Activity   • Alcohol use: Not Currently   • Drug use: Never   • Sexual activity: Yes     Partners: Male     Birth control/protection: I U D       Comment:    Other Topics Concern   • Not on file   Social History Narrative   • Not on file     Social Determinants of Health     Financial Resource Strain: Not on file   Food Insecurity: Not on file   Transportation Needs: Not on file   Physical Activity: Not on file   Stress: Not on file   Social Connections: Not on file   Intimate Partner Violence: Not on file   Housing Stability: Not on file       Objective     Vitals:    05/09/23 1300   BP: 104/68   Pulse: 78   Temp: 98 1 °F (36 7 °C) SpO2: 99%     Wt Readings from Last 3 Encounters:   05/09/23 59 3 kg (130 lb 12 8 oz)   02/09/23 58 5 kg (129 lb)   11/29/22 59 4 kg (131 lb)       Physical Exam  Vitals and nursing note reviewed  Constitutional:       General: She is not in acute distress  Appearance: Normal appearance  She is not ill-appearing or toxic-appearing  Cardiovascular:      Rate and Rhythm: Normal rate and regular rhythm  Pulses: Normal pulses  Heart sounds: Normal heart sounds  No murmur heard  Pulmonary:      Effort: Pulmonary effort is normal  No respiratory distress  Breath sounds: Normal breath sounds  No wheezing, rhonchi or rales  Musculoskeletal:      Right lower leg: No edema  Left lower leg: No edema  Skin:     Comments: No alopecia patches noted  Difficult to fully appreciate any areas of excessive hair loss by gross inspection     Neurological:      Mental Status: She is alert  Psychiatric:         Mood and Affect: Mood normal          Behavior: Behavior normal          Thought Content:  Thought content normal          Judgment: Judgment normal          Pertinent Laboratory/Diagnostic Studies:  Lab Results   Component Value Date    BUN 8 08/13/2021    CREATININE 0 44 (L) 08/13/2021    CALCIUM 8 7 08/13/2021    K 4 3 08/13/2021    CO2 24 08/13/2021     (H) 08/13/2021     Lab Results   Component Value Date    ALT 27 08/13/2021    AST 18 08/13/2021    ALKPHOS 64 08/13/2021       Lab Results   Component Value Date    WBC 5 87 08/13/2021    HGB 13 0 08/13/2021    HCT 39 5 08/13/2021    MCV 93 08/13/2021     08/13/2021       No results found for: TSH    No results found for: CHOL  Lab Results   Component Value Date    TRIG 67 08/13/2021     Lab Results   Component Value Date    HDL 58 08/13/2021     Lab Results   Component Value Date    LDLCALC 71 08/13/2021     Lab Results   Component Value Date    HGBA1C 5 0 09/23/2020       Results for orders placed or performed in visit on 11/29/22   Liquid-based pap, screening   Result Value Ref Range    Case Report       Gynecologic Cytology Report                       Case: [id number]                                  Authorizing Provider:  ELMER Hahn      Collected:           11/29/2022 0920              Ordering Location:     [facility]      Received:            11/29/2022 0920                                     [facility] Screen:          Genesis Pompa                                                                  Specimen:    LIQUID-BASED PAP, SCREENING, Cervix                                                        Primary Interpretation Negative for intraepithelial lesion or malignancy     Specimen Adequacy       Satisfactory for evaluation  Endocervical/transformation zone component present  Additional Information       [employer]'s FDA approved ,  and ThinPrep Imaging Duo System are utilized with strict adherence to the 's instruction manual to prepare gynecologic and non-gynecologic cytology specimens for the production of ThinPrep slides as well as for gynecologic ThinPrep imaging  These processes have been validated by our laboratory and/or by the   The Pap test is not a diagnostic procedure and should not be used as the sole means to detect cervical cancer  It is only a screening procedure to aid in the detection of cervical cancer and its precursors  Both false-negative and false-positive results have been experienced  Your patient's test result should be interpreted in this context together with the history and clinical findings  LMP 11/4/2022        No orders of the defined types were placed in this encounter        ALLERGIES:  No Known Allergies    Current Medications     Current Outpatient Medications   Medication Sig Dispense Refill   • Multiple Vitamin (multivitamin) tablet Take 1 tablet by mouth daily     • Multiple Vitamins-Minerals (HAIR VITAMINS PO) Take by mouth       No current facility-administered medications for this visit           Health Maintenance     Health Maintenance   Topic Date Due   • COVID-19 Vaccine (3 - Booster for Pfizer series) 06/26/2021   • Depression Screening  11/29/2023   • Annual Physical  11/29/2023   • BMI: Adult  02/09/2024   • Cervical Cancer Screening  11/29/2025   • DTaP,Tdap,and Td Vaccines (2 - Td or Tdap) 04/05/2029   • HIV Screening  Completed   • Hepatitis C Screening  Completed   • Influenza Vaccine  Completed   • Pneumococcal Vaccine: Pediatrics (0 to 5 Years) and At-Risk Patients (6 to 59 Years)  Aged Out   • HIB Vaccine  Aged Out   • IPV Vaccine  Aged Out   • Hepatitis A Vaccine  Aged Out   • Meningococcal ACWY Vaccine  Aged Out   • HPV Vaccine  Aged [employer] History   Administered Date(s) Administered   • COVID-19 PFIZER VACCINE 0 3 ML IM 04/12/2021, 05/01/2021   • INFLUENZA 10/18/2022   • Tdap 04/05/2019          Killian Ha DO

## 2023-05-10 NOTE — RESULT ENCOUNTER NOTE
I personally called patient at 0096 - and told her about (+) pregnancy test  Her cbc and tsh are normal  Discussed that I would not recommend any medication at this time for hair loss - but to followup after pregnancy if still a concern  She was advised to call ob to schedule an appt and to start on pre-kwan vitamin   (Note -I tried initially to contact pt on her home # - her  answered but is not listed on patient communication so I did not leave any information with him)

## 2023-05-12 ENCOUNTER — APPOINTMENT (OUTPATIENT)
Dept: LAB | Facility: MEDICAL CENTER | Age: 28
End: 2023-05-12

## 2023-05-12 ENCOUNTER — ULTRASOUND (OUTPATIENT)
Dept: OBGYN CLINIC | Facility: MEDICAL CENTER | Age: 28
End: 2023-05-12

## 2023-05-12 VITALS
BODY MASS INDEX: 22.86 KG/M2 | DIASTOLIC BLOOD PRESSURE: 60 MMHG | WEIGHT: 129 LBS | HEIGHT: 63 IN | SYSTOLIC BLOOD PRESSURE: 100 MMHG

## 2023-05-12 DIAGNOSIS — N92.6 MISSED MENSES: ICD-10-CM

## 2023-05-12 DIAGNOSIS — Z32.01 POSITIVE URINE PREGNANCY TEST: Primary | ICD-10-CM

## 2023-05-12 LAB
BACTERIA UR QL AUTO: NORMAL /HPF
BILIRUB UR QL STRIP: NEGATIVE
CLARITY UR: CLEAR
COLOR UR: YELLOW
GLUCOSE UR STRIP-MCNC: NEGATIVE MG/DL
HGB UR QL STRIP.AUTO: NEGATIVE
KETONES UR STRIP-MCNC: NEGATIVE MG/DL
LEUKOCYTE ESTERASE UR QL STRIP: NEGATIVE
NITRITE UR QL STRIP: NEGATIVE
NON-SQ EPI CELLS URNS QL MICRO: NORMAL /HPF
PH UR STRIP.AUTO: 6 [PH]
PROT UR STRIP-MCNC: ABNORMAL MG/DL
RBC #/AREA URNS AUTO: NORMAL /HPF
SP GR UR STRIP.AUTO: 1.03 (ref 1–1.03)
UROBILINOGEN UR STRIP-ACNC: <2 MG/DL
WBC #/AREA URNS AUTO: NORMAL /HPF

## 2023-05-12 PROCEDURE — 81001 URINALYSIS AUTO W/SCOPE: CPT | Performed by: FAMILY MEDICINE

## 2023-05-12 NOTE — RESULT ENCOUNTER NOTE
Walt ball -   Your repeat urine no longer shows blood.   There is a small amount of protein - but OB will keep an eye on this during your pregnancy  Have a nice weekend -   Dr Michelle Caba

## 2023-05-12 NOTE — PROGRESS NOTES
"Pregnancy Confirmation Visit  [facility] [address] [place] [address]    Assessment/Plan:  32 y o  [id number] presenting with missed menses  Viable pregnancy 9w0d by ultrasound today  Unsure of LMP  - Continue/start prenatal vitamin  - Prenatal labs ordered  - Schedule prenatal nursing Intake in 2 weeks  - Initial prenatal visit in 4 weeks    Subjective:    CC: Missed period    Destiny Castillo is a 32 y o  [id number] who presents with missed menses  LMP No LMP recorded (lmp unknown)  Patient is pregnant         Patient notes that this pregnancy was planned and desired  She was not using contraception at the time of conception  She reports she is not certain of her LMP and that she has regular menses  She has has no vaginal bleeding since her LMP      Objective:  /60 (BP Location: Left arm)   Ht 5' 3\" (1 6 m)   Wt 58 5 kg (129 lb)   LMP  (LMP Unknown)   BMI 22 85 kg/m²     Physical Exam:  General: Well appearing, no distress  CV: Regular rate  Respiratory: Unlabored breathing  Abdomen: Soft, nontender  Extremities: Without edema  Mood and Affect: Appropriate    Transvaginal Pelvic Ultrasound  Marcelo IUP  Yolk sac: Present  Fetal Pole: Present  CRL consistent with EGA 9w0d  Cardiac activity: Present   bpm  No adnexal masses appreciated  "

## 2023-05-22 ENCOUNTER — TELEPHONE (OUTPATIENT)
Dept: OBGYN CLINIC | Facility: MEDICAL CENTER | Age: 28
End: 2023-05-22

## 2023-05-22 DIAGNOSIS — Z34.81 PRENATAL CARE, SUBSEQUENT PREGNANCY, FIRST TRIMESTER: Primary | ICD-10-CM

## 2023-05-30 ENCOUNTER — APPOINTMENT (OUTPATIENT)
Dept: LAB | Facility: MEDICAL CENTER | Age: 28
End: 2023-05-30

## 2023-05-30 ENCOUNTER — INITIAL PRENATAL (OUTPATIENT)
Dept: OBGYN CLINIC | Facility: MEDICAL CENTER | Age: 28
End: 2023-05-30

## 2023-05-30 VITALS
HEIGHT: 63 IN | BODY MASS INDEX: 22.71 KG/M2 | WEIGHT: 128.2 LBS | DIASTOLIC BLOOD PRESSURE: 62 MMHG | SYSTOLIC BLOOD PRESSURE: 98 MMHG

## 2023-05-30 DIAGNOSIS — Z34.81 PRENATAL CARE, SUBSEQUENT PREGNANCY, FIRST TRIMESTER: Primary | ICD-10-CM

## 2023-05-30 DIAGNOSIS — Z34.81 PRENATAL CARE, SUBSEQUENT PREGNANCY, FIRST TRIMESTER: ICD-10-CM

## 2023-05-30 LAB
ABO GROUP BLD: NORMAL
BACTERIA UR QL AUTO: ABNORMAL /HPF
BASOPHILS # BLD AUTO: 0.02 THOUSANDS/ÂΜL (ref 0–0.1)
BASOPHILS NFR BLD AUTO: 0 % (ref 0–1)
BILIRUB UR QL STRIP: NEGATIVE
BLD GP AB SCN SERPL QL: NEGATIVE
CLARITY UR: CLEAR
COLOR UR: YELLOW
EOSINOPHIL # BLD AUTO: 0.18 THOUSAND/ÂΜL (ref 0–0.61)
EOSINOPHIL NFR BLD AUTO: 2 % (ref 0–6)
ERYTHROCYTE [DISTWIDTH] IN BLOOD BY AUTOMATED COUNT: 13.2 % (ref 11.6–15.1)
GLUCOSE UR STRIP-MCNC: NEGATIVE MG/DL
HCT VFR BLD AUTO: 36.7 % (ref 34.8–46.1)
HGB BLD-MCNC: 12.7 G/DL (ref 11.5–15.4)
HGB UR QL STRIP.AUTO: NEGATIVE
HIV 1+2 AB+HIV1 P24 AG SERPL QL IA: NORMAL
HIV 2 AB SERPL QL IA: NORMAL
HIV1 AB SERPL QL IA: NORMAL
HIV1 P24 AG SERPL QL IA: NORMAL
IMM GRANULOCYTES # BLD AUTO: 0.03 THOUSAND/UL (ref 0–0.2)
IMM GRANULOCYTES NFR BLD AUTO: 0 % (ref 0–2)
KETONES UR STRIP-MCNC: NEGATIVE MG/DL
LEUKOCYTE ESTERASE UR QL STRIP: NEGATIVE
LYMPHOCYTES # BLD AUTO: 1.55 THOUSANDS/ÂΜL (ref 0.6–4.47)
LYMPHOCYTES NFR BLD AUTO: 18 % (ref 14–44)
MCH RBC QN AUTO: 31.6 PG (ref 26.8–34.3)
MCHC RBC AUTO-ENTMCNC: 34.6 G/DL (ref 31.4–37.4)
MCV RBC AUTO: 91 FL (ref 82–98)
MONOCYTES # BLD AUTO: 0.48 THOUSAND/ÂΜL (ref 0.17–1.22)
MONOCYTES NFR BLD AUTO: 6 % (ref 4–12)
MUCOUS THREADS UR QL AUTO: ABNORMAL
NEUTROPHILS # BLD AUTO: 6.24 THOUSANDS/ÂΜL (ref 1.85–7.62)
NEUTS SEG NFR BLD AUTO: 74 % (ref 43–75)
NITRITE UR QL STRIP: NEGATIVE
NON-SQ EPI CELLS URNS QL MICRO: ABNORMAL /HPF
NRBC BLD AUTO-RTO: 0 /100 WBCS
PH UR STRIP.AUTO: 6 [PH]
PLATELET # BLD AUTO: 252 THOUSANDS/UL (ref 149–390)
PMV BLD AUTO: 10.5 FL (ref 8.9–12.7)
PROGEST SERPL-MCNC: 18.46 NG/ML
PROT UR STRIP-MCNC: ABNORMAL MG/DL
RBC # BLD AUTO: 4.02 MILLION/UL (ref 3.81–5.12)
RBC #/AREA URNS AUTO: ABNORMAL /HPF
RH BLD: POSITIVE
RUBV IGG SERPL IA-ACNC: 90.4 IU/ML
SP GR UR STRIP.AUTO: 1.03 (ref 1–1.03)
SPECIMEN EXPIRATION DATE: NORMAL
TREPONEMA PALLIDUM IGG+IGM AB [PRESENCE] IN SERUM OR PLASMA BY IMMUNOASSAY: NORMAL
UROBILINOGEN UR STRIP-ACNC: <2 MG/DL
WBC # BLD AUTO: 8.5 THOUSAND/UL (ref 4.31–10.16)
WBC #/AREA URNS AUTO: ABNORMAL /HPF

## 2023-05-30 NOTE — PROGRESS NOTES
[id number]  OB History    Para Term  AB Living   3 2 2 0 0 2   SAB IAB Ectopic Multiple Live Births           2      # Outcome Date GA Lbr Ulisses/2nd Weight Sex Delivery Anes PTL Lv   3 Current            2 Term 2019 40w0d   M Vag-Spont   ELIJAH   1 Term 2016 39w0d  3317 g (7 lb 5 oz) M Vag-Spont  N ELIJAH         * Pt presents for OB intake  *  *Pt's LMP was No LMP recorded (lmp unknown)  Patient is pregnant  *Ultrasound date:2023   9weeks 0days  *Estimated date of delivery: 12/15/2023   * confirmed by US    *Signs/Symptoms of Pregnancy   *no Constipation    *yes Headaches   *no Cramping  *no Spotting      Diabetes     *no Hx of GDM    *no BMI >35    *no First degree relative with type 2 diabetes    *no Hx of PCOS     Hypertension-    *no Hx of chronic HTN    *no Hx of gestational HTN   *no hx of preeclampsia, eclampsia, or HELLP syndrome     *Infection Screening   *no Does the pt have a hx of MRSA? *no History of herpes?    *Immunizations:   *yes Discussed influenza vaccine   *yes Discussed TDaP vaccine   *yes COVID Vaccine x3    [facility]  Depression *no   Anxiety*no  Medications*no    *Interview education   *Handouts given:    *Baby and Me support center     *[facility] sign up instructions    *Lab Locations    *[facility] Pediatricians List/Choosing Pediatrician Sheet    *[facility] Childbirth and Parenting Classes    *Schedule for Prenatal Visits    *Pregnancy Warning Signs Reviewed    *Safe Medications During Pregnancy    *SMA and CF Testing information sheet    *CPT Code Sheet/MFM 13week NT pamphlet    *[employer]      SBIRT Screen:   Depression Screening Follow-up Plan: Patient's depression screening was negative with an [place] score of  0        *[facility]   *yes discussed genetic testing- pt interested   Patient has been informed of basic prenatal advice such as avoiding alcohol, drugs, and smoking  She should remain hydrated and take daily prenatal vitamins  Patient should avoid caffeine, raw sprouts, high mercury fish, undercooked fish, raw eggs, organ meat, unwashed produce, and unpasteurized cheeses, milk, and fruit juice and undercooked meats  She has been informed about toxoplasmosis and to avoid cat feces  *Details that I feel the provider should be aware of:  Patient came in for OB Intake at 11w4d  She c/o occasional headaches  Pregnancy medication list reviewed with patient  Patient requesting to only have female providers  Aware we only have female providers here in the office  Labs ordered and MFM appointment already scheduled  Heart tones done per patient's request  [id number]  PN1 visit scheduled for *6/6/23  The patient was oriented to our practice and all questions were answered      Interviewed by:  Kelly Rosenthal

## 2023-05-31 LAB
HBV SURFACE AB SER-ACNC: <3 MIU/ML
HBV SURFACE AG SER QL: NORMAL

## 2023-06-01 LAB
BACTERIA UR CULT: ABNORMAL
BACTERIA UR CULT: ABNORMAL

## 2023-06-05 PROBLEM — Z30.431 IUD CHECK UP: Status: RESOLVED | Noted: 2022-11-29 | Resolved: 2023-06-05

## 2023-06-05 NOTE — PROGRESS NOTES
Prenatal H&P     Denies loss of fluid, vaginal discharge, vaginal bleeding  and abdominal pain  Not feeling fetal movement  Tolerating PNV well  Prenatal panel reviewed significant hepatitis B nonimmune and  possible UTI  Repeat urine culture collected today  Plans for genetic screening appointment scheduled 23  Planned pregnancy  OB history:     G1- 2016 term  male 7lb 5oz; denies complications     G2- 3837 term  male 8lb 5oz (per patient); denies complications     G3- current     Dating:     LMP - unknown TARIK NA     US on 23 @  9w TARIK 12/15/23  Working TARIK 12/15/23    Surgical history:     No past surgeries    Medical History:     COVID     Social history:     Alcohol/ tobacco/ illicit drug rare alcohol use prior to pregnancy/denies tobacco or drug use     Denies history of STD/STI     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, family and friends supportive     PCP/ Dental last PE 2023/ last cleaning 2022     SBIRT screen negative at intake    She denies a hx of recent cough, chills, fever,  STD/STI, denies a personal history  of TB or  Zika virus or close contacts with persons with TB or COVID -19  FOB niece with mild autism  She denies any other family history of inheritable conditions such as physical or intellectual disabilities, birth defects, blood disorders, heart or neural tube defects  She has never had MRSA  She denies recent travel or travel planned in the near future  Patient Plans   - Feeding breast-feeding  - Contraception ParaGard IUD  - Aware office delivers at [place]  If < 32 weeks will recommend evaluation at [address] [place] [address] St:  - Continue prenatal vitamin daily  -  Genetic screening/ [facility] appointment scheduled 23  - repeat urine culture collected   - Hepatitis B nonimmune  Reviewed recommendation for vaccination  - Weight gain in pregnancy- Who BMI recommend 25-35 lb    Healthy diet and daily exercise reviewed and encouraged  - Unit regimen reviewed visit every 4 weeks until 28 weeks, every 2 weeks until 36 weeks and then weekly until delivery  -Gonorrhea/chlamydia collected  Pap smear UTD   - Vaccines in Pregnancy      - TDAP vaccine after 27 gestational weeks     - Reviewed with patient  Pregnancy with COVID infection is considered  high risk and can have poor outcome including  delivery, more severe infection  therefore  pregnant patients are recommended to get  COVID-19 vaccination  Written information provided   Had vaccine x2 and booster  - influenza October- March NA  -  Common discomforts of pregnancy and precautions reviewed  Signs and symptoms to report reviewed  Encouraged social distancing, good hand hygiene, masking, avoiding crowds and written information provided about COVID-19    RTO 4 weeks

## 2023-06-06 ENCOUNTER — INITIAL PRENATAL (OUTPATIENT)
Dept: OBGYN CLINIC | Facility: CLINIC | Age: 28
End: 2023-06-06
Payer: COMMERCIAL

## 2023-06-06 VITALS
BODY MASS INDEX: 23.04 KG/M2 | DIASTOLIC BLOOD PRESSURE: 60 MMHG | HEIGHT: 63 IN | SYSTOLIC BLOOD PRESSURE: 92 MMHG | WEIGHT: 130 LBS

## 2023-06-06 DIAGNOSIS — Z11.3 ROUTINE SCREENING FOR STI (SEXUALLY TRANSMITTED INFECTION): ICD-10-CM

## 2023-06-06 DIAGNOSIS — Z3A.12 12 WEEKS GESTATION OF PREGNANCY: ICD-10-CM

## 2023-06-06 DIAGNOSIS — Z34.91 PRENATAL CARE IN FIRST TRIMESTER: Primary | ICD-10-CM

## 2023-06-06 PROBLEM — Z78.9 NONIMMUNE TO HEPATITIS B VIRUS: Status: ACTIVE | Noted: 2023-06-06

## 2023-06-06 PROCEDURE — 87491 CHLMYD TRACH DNA AMP PROBE: CPT | Performed by: NURSE PRACTITIONER

## 2023-06-06 PROCEDURE — 87086 URINE CULTURE/COLONY COUNT: CPT | Performed by: NURSE PRACTITIONER

## 2023-06-06 PROCEDURE — 87591 N.GONORRHOEAE DNA AMP PROB: CPT | Performed by: NURSE PRACTITIONER

## 2023-06-06 PROCEDURE — 99214 OFFICE O/P EST MOD 30 MIN: CPT | Performed by: NURSE PRACTITIONER

## 2023-06-07 LAB
C TRACH DNA SPEC QL NAA+PROBE: NEGATIVE
N GONORRHOEA DNA SPEC QL NAA+PROBE: NEGATIVE

## 2023-06-08 LAB — BACTERIA UR CULT: NORMAL

## 2023-06-13 ENCOUNTER — ROUTINE PRENATAL (OUTPATIENT)
Dept: PERINATAL CARE | Facility: OTHER | Age: 28
End: 2023-06-13
Payer: COMMERCIAL

## 2023-06-13 VITALS
HEART RATE: 100 BPM | HEIGHT: 63 IN | BODY MASS INDEX: 23.14 KG/M2 | WEIGHT: 130.6 LBS | SYSTOLIC BLOOD PRESSURE: 102 MMHG | DIASTOLIC BLOOD PRESSURE: 60 MMHG

## 2023-06-13 DIAGNOSIS — Z36.82 ENCOUNTER FOR NUCHAL TRANSLUCENCY TESTING: ICD-10-CM

## 2023-06-13 DIAGNOSIS — O36.80X0 ENCOUNTER TO DETERMINE FETAL VIABILITY OF PREGNANCY, SINGLE OR UNSPECIFIED FETUS: Primary | ICD-10-CM

## 2023-06-13 DIAGNOSIS — Z36.9 UNSPECIFIED ANTENATAL SCREENING: ICD-10-CM

## 2023-06-13 DIAGNOSIS — Z34.81 PRENATAL CARE, SUBSEQUENT PREGNANCY, FIRST TRIMESTER: ICD-10-CM

## 2023-06-13 DIAGNOSIS — Z33.1 PREGNANT STATE, INCIDENTAL: ICD-10-CM

## 2023-06-13 DIAGNOSIS — Z3A.13 13 WEEKS GESTATION OF PREGNANCY: ICD-10-CM

## 2023-06-13 PROCEDURE — 76801 OB US < 14 WKS SINGLE FETUS: CPT | Performed by: OBSTETRICS & GYNECOLOGY

## 2023-06-13 PROCEDURE — 76813 OB US NUCHAL MEAS 1 GEST: CPT | Performed by: OBSTETRICS & GYNECOLOGY

## 2023-06-13 PROCEDURE — 36415 COLL VENOUS BLD VENIPUNCTURE: CPT | Performed by: OBSTETRICS & GYNECOLOGY

## 2023-06-13 PROCEDURE — 99243 OFF/OP CNSLTJ NEW/EST LOW 30: CPT | Performed by: OBSTETRICS & GYNECOLOGY

## 2023-06-13 NOTE — LETTER
June 13, 2023     Alma Alvarado MD  [address]    Patient: Jocelynn Corea   YOB: 1995   Date of Visit: 6/13/2023       Dear Dr Dai Avendaño:    Thank you for referring Jocelynn Corea to me for evaluation  Below are my notes for this consultation  If you have questions, please do not hesitate to call me  I look forward to following your patient along with you  Sincerely,        Shilo Cruz MD        CC: No Recipients    Shilo Cruz MD  6/13/2023  9:21 AM  Sign when Signing Visit  Dinh Rosales presents today for genetic screening  She has no significant medical or surgical history  She has a history of 2 previous full-term vaginal deliveries without complications  She has no known drug allergies  Substance use history and family medical history are unremarkable  A review of systems is otherwise negative  We discussed the options for genetic screening, including but not limited to first trimester screening, second trimester screening, combined first and second trimester screening, noninvasive prenatal screening (NIPS) for patients at high risk and diagnostic screening through the use of CVS and amniocentesis  We discussed the risks and benefits of each approach including the sensitivities and false positive rates as well as the difference between a screening test and a diagnostic test  At the conclusion of our discussion the patient elected noninvasive prenatal testing utilizing the [facility] Non-invasive prenatal screening (NIPS) test  The patient had this blood work drawn in the office and the results should be available approximately 7-10 days after her blood draw  Her results will be reported from [facility]  We discussed follow-up in detail and I recommend an anatomy ultrasound be scheduled for 20 weeks gestation  Thank you very much for allowing us to participate in the care of this very nice patient   Should you have any questions, please do "not hesitate to contact me  Portions of the record may have been created with voice recognition software  Occasional wrong word or \"sound a like\" substitutions may have occurred due to the inherent limitations of voice recognition software  Read the chart carefully and recognize, using context, where substitutions have occurred      "

## 2023-06-13 NOTE — PROGRESS NOTES
"Patient chose to have [facility] Non-invasive Prenatal Screen with fetal sex  Patient given brochure and is aware [facility] will contact their insurance and coordinate coverage  Patient made aware she will need to respond to text message or e-mail from [facility] within 2 business days or testing will be run through insurance  Patient informed text message will come from area code  \"415\"  Provided [employer] # [phone] and web site : [email or web address]  \"Orient your test online\" card with barcode and test tube ID provided to patient  Reviewed [facility]'s web site states 5-7 business days for results via their portal    Picket message will be sent to patient when MFM receives results /provider reviews  2 vials of blood drawn from RIGHT arm by Dequan Cosme  Patient tolerated blood draw without difficulty  Specimens labeled with patient identifiers (name, date of birth, specimen collection date), order and specimen were verified with patient, packed and sent via [employer]  FED EX  tracking #  [id number]  Copy of lab order scanned to Epic media  [facility] will have results in approximately 7-10 business days and will call patient or notify via [address]  Patient aware viewing lab result online will reveal fetal sex if ordered  Patient verbalized understanding of all instructions and no questions at this time      "

## 2023-06-13 NOTE — PROGRESS NOTES
"Josh Blevins presents today for genetic screening  She has no significant medical or surgical history  She has a history of 2 previous full-term vaginal deliveries without complications  She has no known drug allergies  Substance use history and family medical history are unremarkable  A review of systems is otherwise negative  We discussed the options for genetic screening, including but not limited to first trimester screening, second trimester screening, combined first and second trimester screening, noninvasive prenatal screening (NIPS) for patients at high risk and diagnostic screening through the use of CVS and amniocentesis  We discussed the risks and benefits of each approach including the sensitivities and false positive rates as well as the difference between a screening test and a diagnostic test  At the conclusion of our discussion the patient elected noninvasive prenatal testing utilizing the [facility] Non-invasive prenatal screening (NIPS) test  The patient had this blood work drawn in the office and the results should be available approximately 7-10 days after her blood draw  Her results will be reported from [facility]  We discussed follow-up in detail and I recommend an anatomy ultrasound be scheduled for 20 weeks gestation  Thank you very much for allowing us to participate in the care of this very nice patient  Should you have any questions, please do not hesitate to contact me  Portions of the record may have been created with voice recognition software  Occasional wrong word or \"sound a like\" substitutions may have occurred due to the inherent limitations of voice recognition software  Read the chart carefully and recognize, using context, where substitutions have occurred    "

## 2023-07-05 ENCOUNTER — APPOINTMENT (OUTPATIENT)
Dept: LAB | Facility: MEDICAL CENTER | Age: 28
End: 2023-07-05
Payer: COMMERCIAL

## 2023-07-05 ENCOUNTER — ROUTINE PRENATAL (OUTPATIENT)
Dept: OBGYN CLINIC | Facility: MEDICAL CENTER | Age: 28
End: 2023-07-05
Payer: COMMERCIAL

## 2023-07-05 VITALS — SYSTOLIC BLOOD PRESSURE: 108 MMHG | BODY MASS INDEX: 23.21 KG/M2 | DIASTOLIC BLOOD PRESSURE: 62 MMHG | WEIGHT: 131 LBS

## 2023-07-05 DIAGNOSIS — Z3A.16 16 WEEKS GESTATION OF PREGNANCY: Primary | ICD-10-CM

## 2023-07-05 DIAGNOSIS — Z3A.16 16 WEEKS GESTATION OF PREGNANCY: ICD-10-CM

## 2023-07-05 PROCEDURE — 99213 OFFICE O/P EST LOW 20 MIN: CPT | Performed by: OBSTETRICS & GYNECOLOGY

## 2023-07-05 PROCEDURE — 82105 ALPHA-FETOPROTEIN SERUM: CPT

## 2023-07-05 PROCEDURE — 36415 COLL VENOUS BLD VENIPUNCTURE: CPT

## 2023-07-05 NOTE — PROGRESS NOTES
Ashley Ivy is a 32y.o. year old  at 16w5d for routine prenatal visit.   + FM, no vaginal bleeding, contractions, or LOF  Complaints: No   Most recent ultrasound and labs reviewed.   Order for MSAFP given   Reviewed labs  - aware Hep B non immune  - may get vaccine in pregnancy through PCP  HAs level 2 scheduled

## 2023-07-10 LAB
2ND TRIMESTER 4 SCREEN SERPL-IMP: NORMAL
AFP ADJ MOM SERPL: 0.79
AFP INTERP AMN-IMP: NORMAL
AFP INTERP SERPL-IMP: NORMAL
AFP INTERP SERPL-IMP: NORMAL
AFP SERPL-MCNC: 32.6 NG/ML
AGE AT DELIVERY: 28 YR
GA METHOD: NORMAL
GA: 16.7 WEEKS
IDDM PATIENT QL: NO
MULTIPLE PREGNANCY: NO
NEURAL TUBE DEFECT RISK FETUS: NORMAL %

## 2023-07-14 ENCOUNTER — TELEPHONE (OUTPATIENT)
Dept: OBGYN CLINIC | Facility: MEDICAL CENTER | Age: 28
End: 2023-07-14

## 2023-07-14 NOTE — TELEPHONE ENCOUNTER
Patient called. Wanted to know about the results of Alpha fetoprotein, maternal (done on 07/05/23). Advice that we will call back to discuss results. Please review when you have a chance. Thank you!

## 2023-07-31 ENCOUNTER — ROUTINE PRENATAL (OUTPATIENT)
Dept: PERINATAL CARE | Facility: OTHER | Age: 28
End: 2023-07-31
Payer: COMMERCIAL

## 2023-07-31 VITALS
BODY MASS INDEX: 24.1 KG/M2 | DIASTOLIC BLOOD PRESSURE: 64 MMHG | HEIGHT: 63 IN | WEIGHT: 136 LBS | HEART RATE: 93 BPM | SYSTOLIC BLOOD PRESSURE: 118 MMHG

## 2023-07-31 DIAGNOSIS — Z36.3 ENCOUNTER FOR ANTENATAL SCREENING FOR MALFORMATION: Primary | ICD-10-CM

## 2023-07-31 DIAGNOSIS — Z3A.20 20 WEEKS GESTATION OF PREGNANCY: ICD-10-CM

## 2023-07-31 DIAGNOSIS — Z36.86 ENCOUNTER FOR ANTENATAL SCREENING FOR CERVICAL LENGTH: ICD-10-CM

## 2023-07-31 PROCEDURE — 76817 TRANSVAGINAL US OBSTETRIC: CPT | Performed by: STUDENT IN AN ORGANIZED HEALTH CARE EDUCATION/TRAINING PROGRAM

## 2023-07-31 PROCEDURE — 99212 OFFICE O/P EST SF 10 MIN: CPT | Performed by: STUDENT IN AN ORGANIZED HEALTH CARE EDUCATION/TRAINING PROGRAM

## 2023-07-31 PROCEDURE — 76805 OB US >/= 14 WKS SNGL FETUS: CPT | Performed by: STUDENT IN AN ORGANIZED HEALTH CARE EDUCATION/TRAINING PROGRAM

## 2023-07-31 NOTE — PROGRESS NOTES
[facility]: Ms. Adebayo Brown was seen today for anatomic survey and cervical length screening ultrasound. See ultrasound report under "OB Procedures" tab. The time spent on this established patient on the encounter date included 5 minutes previsit service time reviewing records and precharting, 5 minutes face-to-face service time counseling regarding results and coordinating care, and  5 minutes charting, totalling 15 minutes. Please don't hesitate to contact our office with any concerns or questions.   -Enoc Henry MD

## 2023-07-31 NOTE — PROGRESS NOTES
Ultrasound Probe Disinfection    A transvaginal ultrasound was performed. Prior to use, disinfection was performed with High Level Disinfection Process (Canvaceon). Probe serial number F3: [id number] was used.       Sary Trinidad  07/31/23  9:03 AM

## 2023-08-01 ENCOUNTER — ROUTINE PRENATAL (OUTPATIENT)
Dept: OBGYN CLINIC | Facility: MEDICAL CENTER | Age: 28
End: 2023-08-01
Payer: COMMERCIAL

## 2023-08-01 VITALS — WEIGHT: 136 LBS | SYSTOLIC BLOOD PRESSURE: 102 MMHG | BODY MASS INDEX: 24.09 KG/M2 | DIASTOLIC BLOOD PRESSURE: 60 MMHG

## 2023-08-01 DIAGNOSIS — Z3A.20 20 WEEKS GESTATION OF PREGNANCY: ICD-10-CM

## 2023-08-01 DIAGNOSIS — Z34.82 ENCOUNTER FOR SUPERVISION OF OTHER NORMAL PREGNANCY IN SECOND TRIMESTER: Primary | ICD-10-CM

## 2023-08-01 PROCEDURE — 99213 OFFICE O/P EST LOW 20 MIN: CPT | Performed by: OBSTETRICS & GYNECOLOGY

## 2023-08-01 NOTE — PROGRESS NOTES
Routine Prenatal Visit  [facility] [address] [place] [address]    Assessment/Plan:  Boone Shaikh is a 32y.o. year old  at 21w3d who presents for routine prenatal visit. 1. Encounter for supervision of other normal pregnancy in second trimester    2. 20 weeks gestation of pregnancy          Subjective:     CC: Prenatal care    Danya Cordero is a 32 y.o.  female who presents for routine prenatal care at 21w3d. Pregnancy ROS: no leakage of fluid, pelvic pain, or vaginal bleeding.  good fetal movement. The following portions of the patient's history were reviewed and updated as appropriate: allergies, current medications, past family history, past medical history, obstetric history, gynecologic history, past social history, past surgical history and problem list.      Objective:  /60   Wt 61.7 kg (136 lb)   LMP  (LMP Unknown)   BMI 24.09 kg/m²   Pregravid Weight/BMI: 56.7 kg (125 lb) (BMI 22.15)  Current Weight: 61.7 kg (136 lb)   Total Weight Gain: 4.99 kg (11 lb)   Pre- Vitals    Flowsheet Row Most Recent Value   Prenatal Assessment    Fetal Heart Rate 158   Fundal Height (cm) 21 cm   Movement Present   Prenatal Vitals    Blood Pressure 102/60   Weight - Scale 61.7 kg (136 lb)   Urine Albumin/Glucose    Dilation/Effacement/Station    Vaginal Drainage    Edema    LLE Edema None   RLE Edema None   Facial Edema None           General: Well appearing, no distress  Respiratory: Unlabored breathing  Cardiovascular: Regular rate. Abdomen: Soft, gravid, nontender  Fundal Height: Appropriate for gestational age. Extremities: Warm and well perfused. Non tender.

## 2023-08-29 ENCOUNTER — ROUTINE PRENATAL (OUTPATIENT)
Dept: OBGYN CLINIC | Facility: MEDICAL CENTER | Age: 28
End: 2023-08-29
Payer: COMMERCIAL

## 2023-08-29 VITALS — WEIGHT: 143.8 LBS | SYSTOLIC BLOOD PRESSURE: 100 MMHG | DIASTOLIC BLOOD PRESSURE: 64 MMHG | BODY MASS INDEX: 25.47 KG/M2

## 2023-08-29 DIAGNOSIS — Z34.92 SECOND TRIMESTER PREGNANCY: Primary | ICD-10-CM

## 2023-08-29 DIAGNOSIS — Z3A.24 24 WEEKS GESTATION OF PREGNANCY: ICD-10-CM

## 2023-08-29 PROCEDURE — 99213 OFFICE O/P EST LOW 20 MIN: CPT | Performed by: OBSTETRICS & GYNECOLOGY

## 2023-08-29 NOTE — PROGRESS NOTES
Assessment  32 y.o. [id number] at 24w4d presenting for routine prenatal visit. Plan  Diagnoses and all orders for this visit:    Second trimester pregnancy  24 weeks gestation of pregnancy  - PTL precautions  - Normal movement discussed  -     Glucose, 1H PG; Future  -     CBC and differential; Future  -     RPR-Syphilis Screening (Total Syphilis IGG/IGM); Future  -     Hepatitis C antibody; Future  -     Anemia Panel w/Reflex, OB; Future  - Return in 4wks for PN        ____________________________________________________________        Subjective    Mariusz Aleman is a 32 y.o. [id number] at 24w4d who presents for routine prenatal visit. She is without complaint. She has some mild pelvic pressure. Denies contractions, loss of fluid, or vaginal bleeding. She feels regular fetal movements. Considering a trip to [facility] in October. 9hr flight. Discussed inc risk of VTE. Discussed possibility of [place]B out of country, although historically delivers at term. Pregnancy Problems:  Patient Active Problem List   Diagnosis   • 24 weeks gestation of pregnancy   • Prenatal care in second trimester   • Nonimmune to hepatitis B virus         Objective  /64   Wt 65.2 kg (143 lb 12.8 oz)   LMP  (LMP Unknown)   BMI 25.47 kg/m²     FHT: 145 BPM   Uterine Size: size equals dates     Physical Exam:  Physical Exam  Constitutional:       General: She is not in acute distress. Appearance: Normal appearance. She is well-developed. She is not ill-appearing, toxic-appearing or diaphoretic. HENT:      Head: Normocephalic and atraumatic. Eyes:      General: No scleral icterus. Right eye: No discharge. Left eye: No discharge. Conjunctiva/sclera: Conjunctivae normal.   Pulmonary:      Effort: Pulmonary effort is normal. No accessory muscle usage or respiratory distress. Abdominal:      General: There is distension (gravid). Tenderness: There is no abdominal tenderness. There is no guarding or rebound. Skin:     General: Skin is warm and dry. Coloration: Skin is not jaundiced. Findings: No bruising, erythema or rash. Neurological:      Mental Status: She is alert. Psychiatric:         Mood and Affect: Mood normal.         Behavior: Behavior normal.         Thought Content:  Thought content normal.         Judgment: Judgment normal.

## 2023-09-05 ENCOUNTER — TELEPHONE (OUTPATIENT)
Dept: OBGYN CLINIC | Facility: MEDICAL CENTER | Age: 28
End: 2023-09-05

## 2023-09-05 NOTE — TELEPHONE ENCOUNTER
Patient called stating she fell on her back this afternoon. Reports pain on her back and vaginal area. Denies any vaginal bleeding or LOF. Per on-call provider patient needs to go to the ED for evaluation. Patient agreeable with plan.

## 2023-09-07 ENCOUNTER — TELEPHONE (OUTPATIENT)
Dept: OBGYN CLINIC | Facility: MEDICAL CENTER | Age: 28
End: 2023-09-07

## 2023-09-07 NOTE — TELEPHONE ENCOUNTER
Returned patient's call and spoke to . Patient was strongly advised to go to the ED to be evaluated after her fall on 9/5 and decided not to. I discussed this with  and he states patient is not in any pain and they feel baby move fine reason why they do not think is necessary to go to the ED. Denies vaginal bleeding, LOF. I strongly advised that she goes to the ED for evaluation as soon as possible but declined. Patient  states they want to wait until tomorrow to see the doctor.

## 2023-09-07 NOTE — TELEPHONE ENCOUNTER
Pt called. Gabe Luther on her back two days ago. Was advice to go to the ER but didn't go. Wanting to schedule an appt to check on the baby. Schedule with Raj tomorrow 09/08. Please review if we need to get any more information from her, ty!

## 2023-09-08 ENCOUNTER — ROUTINE PRENATAL (OUTPATIENT)
Dept: OBGYN CLINIC | Facility: MEDICAL CENTER | Age: 28
End: 2023-09-08
Payer: COMMERCIAL

## 2023-09-08 VITALS — SYSTOLIC BLOOD PRESSURE: 110 MMHG | WEIGHT: 143 LBS | DIASTOLIC BLOOD PRESSURE: 60 MMHG | BODY MASS INDEX: 25.33 KG/M2

## 2023-09-08 DIAGNOSIS — O9A.213 TRAUMATIC INJURY DURING PREGNANCY IN THIRD TRIMESTER: ICD-10-CM

## 2023-09-08 DIAGNOSIS — Z3A.26 26 WEEKS GESTATION OF PREGNANCY: Primary | ICD-10-CM

## 2023-09-08 DIAGNOSIS — Z78.9 NONIMMUNE TO HEPATITIS B VIRUS: ICD-10-CM

## 2023-09-08 PROCEDURE — 99214 OFFICE O/P EST MOD 30 MIN: CPT | Performed by: OBSTETRICS & GYNECOLOGY

## 2023-09-08 NOTE — PROGRESS NOTES
Routine Prenatal Visit  [facility] [address] [place] [address]    Assessment/Plan:  Rosina Rodriguez is a 32y.o. year old  at 26w0d who presents for routine prenatal visit. 1. 26 weeks gestation of pregnancy  Assessment & Plan:  NIPT / AFP normal     Has apt for 28 weeks already   Remind to do the blood work prior to that visit as planned. 2. Nonimmune to hepatitis B virus  Assessment & Plan:  Vaccine pp       3. Traumatic injury during pregnancy in third trimester  Assessment & Plan:  Gita Hudson down starts 23  Did not go to the hospital   No bleeding   No cramping   baby is moving     Rh positive    Asked no domestic violence            Subjective:     CC: Prenatal care    Veronica So is a 32 y.o.  female who presents for routine prenatal care at 26w0d. Pregnancy ROS: no leakage of fluid, pelvic pain, or vaginal bleeding. yes fetal movement. The following portions of the patient's history were reviewed and updated as appropriate: allergies, current medications, past family history, past medical history, obstetric history, gynecologic history, past social history, past surgical history and problem list.      Objective:  /60   Wt 64.9 kg (143 lb)   LMP  (LMP Unknown)   BMI 25.33 kg/m²   Pregravid Weight/BMI: 56.7 kg (125 lb) (BMI 22.15)  Current Weight: 64.9 kg (143 lb)   Total Weight Gain: 8.165 kg (18 lb)   Pre- Vitals    Flowsheet Row Most Recent Value   Prenatal Assessment    Fetal Heart Rate 141   Movement Present   Prenatal Vitals    Blood Pressure 110/60   Weight - Scale 64.9 kg (143 lb)   Urine Albumin/Glucose    Dilation/Effacement/Station    Vaginal Drainage    Draining Fluid No   Edema            General: Well appearing, no distress  Respiratory: Unlabored breathing  Cardiovascular: Regular rate. Abdomen: Soft, gravid, nontender  Fundal Height: Appropriate for gestational age. Extremities: Warm and well perfused. Non tender.

## 2023-09-08 NOTE — ASSESSMENT & PLAN NOTE
Benton Avila down starts 9/6/23  Did not go to the hospital   No bleeding   No cramping   baby is moving     Rh positive    Asked no domestic violence

## 2023-09-08 NOTE — ASSESSMENT & PLAN NOTE
NIPT / AFP normal     Has apt for 28 weeks already   Remind to do the blood work prior to that visit as planned.

## 2023-09-22 ENCOUNTER — APPOINTMENT (OUTPATIENT)
Dept: LAB | Facility: MEDICAL CENTER | Age: 28
End: 2023-09-22
Payer: COMMERCIAL

## 2023-09-22 DIAGNOSIS — Z3A.24 24 WEEKS GESTATION OF PREGNANCY: ICD-10-CM

## 2023-09-22 DIAGNOSIS — Z34.92 SECOND TRIMESTER PREGNANCY: ICD-10-CM

## 2023-09-22 LAB
BASOPHILS # BLD AUTO: 0.03 THOUSANDS/ÂΜL (ref 0–0.1)
BASOPHILS NFR BLD AUTO: 0 % (ref 0–1)
EOSINOPHIL # BLD AUTO: 0.13 THOUSAND/ÂΜL (ref 0–0.61)
EOSINOPHIL NFR BLD AUTO: 1 % (ref 0–6)
ERYTHROCYTE [DISTWIDTH] IN BLOOD BY AUTOMATED COUNT: 13.1 % (ref 11.6–15.1)
GLUCOSE 1H P 50 G GLC PO SERPL-MCNC: 111 MG/DL (ref 40–134)
HCT VFR BLD AUTO: 33.9 % (ref 34.8–46.1)
HCV AB SER QL: NORMAL
HGB BLD-MCNC: 11.6 G/DL (ref 11.5–15.4)
IMM GRANULOCYTES # BLD AUTO: 0.08 THOUSAND/UL (ref 0–0.2)
IMM GRANULOCYTES NFR BLD AUTO: 1 % (ref 0–2)
LYMPHOCYTES # BLD AUTO: 1.43 THOUSANDS/ÂΜL (ref 0.6–4.47)
LYMPHOCYTES NFR BLD AUTO: 14 % (ref 14–44)
MCH RBC QN AUTO: 32.3 PG (ref 26.8–34.3)
MCHC RBC AUTO-ENTMCNC: 34.2 G/DL (ref 31.4–37.4)
MCV RBC AUTO: 94 FL (ref 82–98)
MONOCYTES # BLD AUTO: 0.59 THOUSAND/ÂΜL (ref 0.17–1.22)
MONOCYTES NFR BLD AUTO: 6 % (ref 4–12)
NEUTROPHILS # BLD AUTO: 8.3 THOUSANDS/ÂΜL (ref 1.85–7.62)
NEUTS SEG NFR BLD AUTO: 78 % (ref 43–75)
NRBC BLD AUTO-RTO: 0 /100 WBCS
PLATELET # BLD AUTO: 224 THOUSANDS/UL (ref 149–390)
PMV BLD AUTO: 10.6 FL (ref 8.9–12.7)
RBC # BLD AUTO: 3.59 MILLION/UL (ref 3.81–5.12)
TREPONEMA PALLIDUM IGG+IGM AB [PRESENCE] IN SERUM OR PLASMA BY IMMUNOASSAY: NORMAL
WBC # BLD AUTO: 10.56 THOUSAND/UL (ref 4.31–10.16)

## 2023-09-22 PROCEDURE — 86780 TREPONEMA PALLIDUM: CPT

## 2023-09-22 PROCEDURE — 86803 HEPATITIS C AB TEST: CPT

## 2023-09-22 PROCEDURE — 85025 COMPLETE CBC W/AUTO DIFF WBC: CPT

## 2023-09-22 PROCEDURE — 36415 COLL VENOUS BLD VENIPUNCTURE: CPT

## 2023-09-22 PROCEDURE — 82950 GLUCOSE TEST: CPT

## 2023-09-27 PROBLEM — Z3A.28 28 WEEKS GESTATION OF PREGNANCY: Status: ACTIVE | Noted: 2023-06-06

## 2023-09-28 ENCOUNTER — ROUTINE PRENATAL (OUTPATIENT)
Dept: OBGYN CLINIC | Facility: MEDICAL CENTER | Age: 28
End: 2023-09-28
Payer: COMMERCIAL

## 2023-09-28 VITALS — SYSTOLIC BLOOD PRESSURE: 120 MMHG | DIASTOLIC BLOOD PRESSURE: 70 MMHG | BODY MASS INDEX: 26.36 KG/M2 | WEIGHT: 148.8 LBS

## 2023-09-28 DIAGNOSIS — Z3A.28 28 WEEKS GESTATION OF PREGNANCY: Primary | ICD-10-CM

## 2023-09-28 LAB
DME PARACHUTE DELIVERY DATE REQUESTED: NORMAL
DME PARACHUTE ITEM DESCRIPTION: NORMAL
DME PARACHUTE ORDER STATUS: NORMAL
DME PARACHUTE SUPPLIER NAME: NORMAL
DME PARACHUTE SUPPLIER PHONE: NORMAL

## 2023-09-28 PROCEDURE — 99213 OFFICE O/P EST LOW 20 MIN: CPT | Performed by: NURSE PRACTITIONER

## 2023-09-28 NOTE — PROGRESS NOTES
Denies loss of fluid, vaginal bleeding and abdominal pain. Confirms frequent fetal movement. Tolerating well. Reviewed 28-week labs-WNL. Reviewed recommendation for both Tdap and influenza vaccine. Patient is planning to consider Tdap and states she already had influenza vaccine through pediatrician. Will bring in documentation. Denies questions or concerns at today's visit  Patient plans include breast-feeding (pump ordered), no epidural for pain control in labor, ParaGard IUD for postpartum contraception and [facility]. BP: 120/70 weight: +23lbs  Plan  -Continue prenatal vitamins daily  -Fetal kick counts reviewed, encouraged daily and written information provided  -28-week folder provided and reviewed. Patient requested to take home delivery consent for  to review. Pump ordered. Is considering Tdap vaccine. Will bring in documentation of influenza vaccine this season  -Common discomforts of pregnancy and precautions including  labor reviewed. Signs and symptoms to report reviewed.   Written information provided by COVID-19  RTO 2 weeks f/u TDAP

## 2023-10-10 PROBLEM — Z3A.30 30 WEEKS GESTATION OF PREGNANCY: Status: ACTIVE | Noted: 2023-06-06

## 2023-10-10 NOTE — PROGRESS NOTES
Problem List Items Addressed This Visit          Other    30 weeks gestation of pregnancy - Primary     NIPT / AFP normal     28 weeks labs reviewed and all ok     Pt does at time report some back pain and groin pain   We discussed the maternity belt and increase po intake   No increase in watery discharge or bleeding    No bleeding   No contraction              Nonimmune to hepatitis B virus     Vaccine recommend now or PP it is safe to get during the pregnancy

## 2023-10-10 NOTE — ASSESSMENT & PLAN NOTE
NIPT / AFP normal     28 weeks labs reviewed and all ok     Pt does at time report some back pain and groin pain   We discussed the maternity belt and increase po intake   No increase in watery discharge or bleeding    No bleeding   No contraction

## 2023-10-11 ENCOUNTER — ROUTINE PRENATAL (OUTPATIENT)
Dept: OBGYN CLINIC | Facility: MEDICAL CENTER | Age: 28
End: 2023-10-11
Payer: COMMERCIAL

## 2023-10-11 VITALS — SYSTOLIC BLOOD PRESSURE: 110 MMHG | WEIGHT: 152.1 LBS | DIASTOLIC BLOOD PRESSURE: 60 MMHG | BODY MASS INDEX: 26.94 KG/M2

## 2023-10-11 DIAGNOSIS — Z78.9 NONIMMUNE TO HEPATITIS B VIRUS: ICD-10-CM

## 2023-10-11 DIAGNOSIS — Z3A.30 30 WEEKS GESTATION OF PREGNANCY: Primary | ICD-10-CM

## 2023-10-11 PROCEDURE — 99214 OFFICE O/P EST MOD 30 MIN: CPT | Performed by: OBSTETRICS & GYNECOLOGY

## 2023-10-24 ENCOUNTER — ROUTINE PRENATAL (OUTPATIENT)
Dept: OBGYN CLINIC | Facility: MEDICAL CENTER | Age: 28
End: 2023-10-24

## 2023-10-24 VITALS — DIASTOLIC BLOOD PRESSURE: 62 MMHG | SYSTOLIC BLOOD PRESSURE: 114 MMHG | WEIGHT: 153 LBS | BODY MASS INDEX: 27.1 KG/M2

## 2023-10-24 DIAGNOSIS — N89.8 VAGINAL ODOR: Primary | ICD-10-CM

## 2023-10-24 DIAGNOSIS — Z3A.32 32 WEEKS GESTATION OF PREGNANCY: ICD-10-CM

## 2023-10-24 PROCEDURE — 87510 GARDNER VAG DNA DIR PROBE: CPT | Performed by: OBSTETRICS & GYNECOLOGY

## 2023-10-24 PROCEDURE — 87660 TRICHOMONAS VAGIN DIR PROBE: CPT | Performed by: OBSTETRICS & GYNECOLOGY

## 2023-10-24 PROCEDURE — 87480 CANDIDA DNA DIR PROBE: CPT | Performed by: OBSTETRICS & GYNECOLOGY

## 2023-10-24 NOTE — PROGRESS NOTES
David Lowry is a 32y.o. year old  at 32w4d for routine prenatal visit.   + FM, no vaginal bleeding, contractions, or LOF  Complaints: Yes - vaginal odor  - affirm collected    Most recent ultrasound and labs reviewed.   Undecided about TDAP - risks and benefits reviewed  [id number] Pily Aparicio reviewed   Supriya height  = dates

## 2023-10-25 DIAGNOSIS — B96.89 BV (BACTERIAL VAGINOSIS): Primary | ICD-10-CM

## 2023-10-25 DIAGNOSIS — N76.0 BV (BACTERIAL VAGINOSIS): Primary | ICD-10-CM

## 2023-10-25 LAB
CANDIDA RRNA VAG QL PROBE: NEGATIVE
G VAGINALIS RRNA GENITAL QL PROBE: POSITIVE
T VAGINALIS RRNA GENITAL QL PROBE: NEGATIVE

## 2023-10-25 RX ORDER — METRONIDAZOLE 500 MG/1
500 TABLET ORAL EVERY 12 HOURS SCHEDULED
Qty: 14 TABLET | Refills: 0 | Status: SHIPPED | OUTPATIENT
Start: 2023-10-25 | End: 2023-11-01

## 2023-11-07 ENCOUNTER — ROUTINE PRENATAL (OUTPATIENT)
Dept: OBGYN CLINIC | Facility: MEDICAL CENTER | Age: 28
End: 2023-11-07
Payer: COMMERCIAL

## 2023-11-07 VITALS — SYSTOLIC BLOOD PRESSURE: 110 MMHG | WEIGHT: 154.2 LBS | BODY MASS INDEX: 27.32 KG/M2 | DIASTOLIC BLOOD PRESSURE: 64 MMHG

## 2023-11-07 DIAGNOSIS — Z34.93 PRENATAL CARE IN THIRD TRIMESTER: Primary | ICD-10-CM

## 2023-11-07 DIAGNOSIS — Z3A.34 34 WEEKS GESTATION OF PREGNANCY: ICD-10-CM

## 2023-11-07 DIAGNOSIS — Z23 ENCOUNTER FOR IMMUNIZATION: ICD-10-CM

## 2023-11-07 DIAGNOSIS — Z23 NEED FOR TDAP VACCINATION: ICD-10-CM

## 2023-11-07 PROCEDURE — 90715 TDAP VACCINE 7 YRS/> IM: CPT | Performed by: OBSTETRICS & GYNECOLOGY

## 2023-11-07 PROCEDURE — 99213 OFFICE O/P EST LOW 20 MIN: CPT | Performed by: OBSTETRICS & GYNECOLOGY

## 2023-11-07 PROCEDURE — 90471 IMMUNIZATION ADMIN: CPT | Performed by: OBSTETRICS & GYNECOLOGY

## 2023-11-07 NOTE — PATIENT INSTRUCTIONS
Fetal Movement   WHAT YOU NEED TO KNOW:   Fetal movements are the kicks, rolls, and hiccups of your unborn baby. You may start to feel these movements when you are 20 weeks pregnant. The movements grow stronger and more frequent as your baby grows. Fetal movements show that your unborn baby is getting the oxygen and nutrients he or she needs before birth. Fewer fetal movements may signal a problem with your baby's health. DISCHARGE INSTRUCTIONS:   Follow up with your doctor or obstetrician as directed:  Write down your questions so you remember to ask them during your visits. Normal fetal movement:  Fetal activity can be described by 4 states, from least to most active. During quiet sleep, your unborn baby may be still for up to 2 hours. During active sleep, he or she kicks, rolls, and moves often. During the quiet awake state, he or she may only move his or her eyes. The active awake state includes strong kicks and rolls. What affects fetal movement:  You may feel your baby move more after you eat, or after you drink caffeine. You may feel your baby move less while you are more active, such as when you exercise. You may also feel fewer movements if you are obese. Certain medicines can change your baby's movements. Tell your healthcare provider about the medicines you are taking. Track fetal movements at home:  Fetal movement is most often felt when you lie quietly on your side. Your healthcare provider may ask you to count movements for 2 hours. He or she may ask you to track how long it takes for your baby to move 10 times. Keep a log of your baby's movements. Contact your doctor or obstetrician if:   It takes longer than usual to feel 8 of your unborn baby's movements. You do not feel your unborn baby move at least 10 times in 2 hours. The skin on your hands, feet, and around your eyes is more swollen than usual.    You have a headache for at least 24 hours.     Tiny red dots appear on your skin.    Your belly is tender when you press on it. You have questions or concerns about your condition or care. Return to the emergency department if:   You do not feel your unborn baby move for 12 hours. You feel cramping or constant pain in your abdomen. You have heavy bleeding from your vagina. You have a severe headache and cannot see clearly. You are having trouble breathing or are vomiting. You have a seizure. © Copyright [facility] 2023 Information is for End User's use only and may not be sold, redistributed or otherwise used for commercial purposes. The above information is an  only. It is not intended as medical advice for individual conditions or treatments. Talk to your doctor, nurse or pharmacist before following any medical regimen to see if it is safe and effective for you.

## 2023-11-07 NOTE — PROGRESS NOTES
Routine Prenatal Visit  [facility] [address] [place] [address]    Assessment/Plan:  Raza Patient is a 32y.o. year old  at 34w4d who presents for routine prenatal visit. 1. Prenatal care in third trimester    2. Encounter for immunization  -     Tdap Vaccine greater than or equal to 6yo    3. Need for Tdap vaccination  -     Tdap Vaccine greater than or equal to 6yo    4. 34 weeks gestation of pregnancy          Subjective:     CC: Prenatal care    Yuliana Salinas is a 32 y.o.  female who presents for routine prenatal care at 34w4d. Pregnancy ROS: no leakage of fluid, pelvic pain, or vaginal bleeding.  good fetal movement. Tdap given today  Give RSV at next visit    The following portions of the patient's history were reviewed and updated as appropriate: allergies, current medications, past family history, past medical history, obstetric history, gynecologic history, past social history, past surgical history and problem list.      Objective:  /64   Wt 69.9 kg (154 lb 3.2 oz)   LMP  (LMP Unknown)   BMI 27.32 kg/m²   Pregravid Weight/BMI: 56.7 kg (125 lb) (BMI 22.15)  Current Weight: 69.9 kg (154 lb 3.2 oz)   Total Weight Gain: 13.2 kg (29 lb 3.2 oz)   Pre- Vitals      Flowsheet Row Most Recent Value   Prenatal Assessment    Fetal Heart Rate 138   Movement Present   Prenatal Vitals    Blood Pressure 110/64   Weight - Scale 69.9 kg (154 lb 3.2 oz)   Urine Albumin/Glucose    Dilation/Effacement/Station    Vaginal Drainage    Draining Fluid No   Edema    LLE Edema None   RLE Edema None   Facial Edema None             General: Well appearing, no distress  Respiratory: Unlabored breathing  Cardiovascular: Regular rate. Abdomen: Soft, gravid, nontender  Fundal Height: Appropriate for gestational age. Extremities: Warm and well perfused. Non tender.

## 2023-11-09 ENCOUNTER — TELEPHONE (OUTPATIENT)
Dept: OBGYN CLINIC | Facility: MEDICAL CENTER | Age: 28
End: 2023-11-09

## 2023-11-09 NOTE — TELEPHONE ENCOUNTER
Pt called into this office stating that she has had a headache that is not relieved by tylenol since last evening. Stated that she also has not been feeling the baby a lot since last night. Stated that movements are decreased and slow. Call provider notified and patient made aware to go to [place] labor and delivery. Pt stated that she will go tomorrow. Pt was made aware that if she is not feeling fetal movement we want her to get checked to make sure she and baby are doing ok. Pt verbalized understanding.

## 2023-11-21 ENCOUNTER — ROUTINE PRENATAL (OUTPATIENT)
Dept: OBGYN CLINIC | Facility: MEDICAL CENTER | Age: 28
End: 2023-11-21
Payer: COMMERCIAL

## 2023-11-21 ENCOUNTER — APPOINTMENT (OUTPATIENT)
Dept: LAB | Facility: MEDICAL CENTER | Age: 28
End: 2023-11-21
Payer: COMMERCIAL

## 2023-11-21 VITALS — SYSTOLIC BLOOD PRESSURE: 104 MMHG | BODY MASS INDEX: 27.46 KG/M2 | WEIGHT: 155 LBS | DIASTOLIC BLOOD PRESSURE: 70 MMHG

## 2023-11-21 DIAGNOSIS — Z23 ENCOUNTER FOR ADMINISTRATION OF VACCINE: ICD-10-CM

## 2023-11-21 DIAGNOSIS — Z78.9 NONIMMUNE TO HEPATITIS B VIRUS: ICD-10-CM

## 2023-11-21 DIAGNOSIS — Z34.93 THIRD TRIMESTER PREGNANCY: ICD-10-CM

## 2023-11-21 DIAGNOSIS — O26.813 FATIGUE DURING PREGNANCY IN THIRD TRIMESTER: ICD-10-CM

## 2023-11-21 DIAGNOSIS — Z3A.36 36 WEEKS GESTATION OF PREGNANCY: ICD-10-CM

## 2023-11-21 DIAGNOSIS — Z34.93 THIRD TRIMESTER PREGNANCY: Primary | ICD-10-CM

## 2023-11-21 LAB
ERYTHROCYTE [DISTWIDTH] IN BLOOD BY AUTOMATED COUNT: 13.2 % (ref 11.6–15.1)
FERRITIN SERPL-MCNC: 9 NG/ML (ref 11–307)
HCT VFR BLD AUTO: 35.6 % (ref 34.8–46.1)
HGB BLD-MCNC: 11.9 G/DL (ref 11.5–15.4)
MCH RBC QN AUTO: 32 PG (ref 26.8–34.3)
MCHC RBC AUTO-ENTMCNC: 33.4 G/DL (ref 31.4–37.4)
MCV RBC AUTO: 96 FL (ref 82–98)
PLATELET # BLD AUTO: 240 THOUSANDS/UL (ref 149–390)
PMV BLD AUTO: 10.6 FL (ref 8.9–12.7)
RBC # BLD AUTO: 3.72 MILLION/UL (ref 3.81–5.12)
TSH SERPL DL<=0.05 MIU/L-ACNC: 1.64 UIU/ML (ref 0.45–4.5)
WBC # BLD AUTO: 11.86 THOUSAND/UL (ref 4.31–10.16)

## 2023-11-21 PROCEDURE — 90678 RSV VACC PREF BIVALENT IM: CPT | Performed by: OBSTETRICS & GYNECOLOGY

## 2023-11-21 PROCEDURE — 82728 ASSAY OF FERRITIN: CPT

## 2023-11-21 PROCEDURE — 84443 ASSAY THYROID STIM HORMONE: CPT

## 2023-11-21 PROCEDURE — 85027 COMPLETE CBC AUTOMATED: CPT

## 2023-11-21 PROCEDURE — 99214 OFFICE O/P EST MOD 30 MIN: CPT | Performed by: OBSTETRICS & GYNECOLOGY

## 2023-11-21 PROCEDURE — 90471 IMMUNIZATION ADMIN: CPT | Performed by: OBSTETRICS & GYNECOLOGY

## 2023-11-21 PROCEDURE — 87150 DNA/RNA AMPLIFIED PROBE: CPT | Performed by: OBSTETRICS & GYNECOLOGY

## 2023-11-21 PROCEDURE — 36415 COLL VENOUS BLD VENIPUNCTURE: CPT

## 2023-11-21 NOTE — PROGRESS NOTES
Assessment  28yo  at 36w4d presenting for routine prenatal visit.     Plan  Diagnoses and all orders for this visit:    Third trimester pregnancy  36 weeks gestation of pregnancy  -     PTL precautions  - FKC  - Strep B DNA probe, amplification  - Return weekly until delivery    Nonimmune to hepatitis B virus  - Booster recommended    Fatigue during pregnancy in third trimester  -     CBC and Platelet; Future  -     Ferritin; Future  -     TSH, 3rd generation with Free T4 reflex; Future    Encounter for administration of vaccine  -     Respiratory Syncytial Virus (RSV) vaccine (recombinant) (Abrysvo)      ____________________________________________________________        Subjective    Kerri Sheri is a 28yo  at 36w4d who presents for routine prenatal visit. She is reporting increasing fatigue with minimal activity. No SOB/cp/palpitations, just wiped after activity. She denies contractions, loss of fluid, or vaginal bleeding. She feels regular fetal movements.         Objective  /70   Wt 70.3 kg (155 lb)   LMP  (LMP Unknown)   BMI 27.46 kg/m²     FHT: 150 BPM   Uterine Size: size equals dates     Physical Exam:  Physical Exam  Constitutional:       General: She is not in acute distress.     Appearance: Normal appearance. She is well-developed. She is not ill-appearing, toxic-appearing or diaphoretic.   HENT:      Head: Normocephalic and atraumatic.   Eyes:      General: No scleral icterus.        Right eye: No discharge.         Left eye: No discharge.      Conjunctiva/sclera: Conjunctivae normal.   Pulmonary:      Effort: Pulmonary effort is normal. No accessory muscle usage or respiratory distress.   Abdominal:      General: There is distension (gravid).      Tenderness: There is no abdominal tenderness. There is no guarding or rebound.   Skin:     General: Skin is warm and dry.      Coloration: Skin is not jaundiced.      Findings: No bruising, erythema or rash.   Neurological:      Mental  Status: She is alert.   Psychiatric:         Mood and Affect: Mood normal.         Behavior: Behavior normal.         Thought Content: Thought content normal.         Judgment: Judgment normal.

## 2023-11-22 DIAGNOSIS — E61.1 IRON DEFICIENCY: Primary | ICD-10-CM

## 2023-11-22 RX ORDER — QUINIDINE GLUCONATE 324 MG
240 TABLET, EXTENDED RELEASE ORAL DAILY
Qty: 30 TABLET | Refills: 1 | Status: SHIPPED | OUTPATIENT
Start: 2023-11-22

## 2023-11-24 LAB — GP B STREP DNA SPEC QL NAA+PROBE: NEGATIVE

## 2023-11-27 NOTE — RESULT ENCOUNTER NOTE
Results not reviewed via [employer]. Please call to review results as per [employer] message attached.

## 2023-11-29 PROBLEM — Z3A.37 37 WEEKS GESTATION OF PREGNANCY: Status: ACTIVE | Noted: 2023-06-06

## 2023-11-30 ENCOUNTER — ROUTINE PRENATAL (OUTPATIENT)
Dept: OBGYN CLINIC | Facility: MEDICAL CENTER | Age: 28
End: 2023-11-30
Payer: COMMERCIAL

## 2023-11-30 VITALS — SYSTOLIC BLOOD PRESSURE: 98 MMHG | DIASTOLIC BLOOD PRESSURE: 70 MMHG | WEIGHT: 157.9 LBS | BODY MASS INDEX: 27.97 KG/M2

## 2023-11-30 DIAGNOSIS — E61.1 IRON DEFICIENCY: ICD-10-CM

## 2023-11-30 DIAGNOSIS — Z3A.37 37 WEEKS GESTATION OF PREGNANCY: ICD-10-CM

## 2023-11-30 DIAGNOSIS — Z34.93 THIRD TRIMESTER PREGNANCY: ICD-10-CM

## 2023-11-30 DIAGNOSIS — O9A.213 TRAUMATIC INJURY DURING PREGNANCY IN THIRD TRIMESTER: Primary | ICD-10-CM

## 2023-11-30 PROCEDURE — 99213 OFFICE O/P EST LOW 20 MIN: CPT | Performed by: ADVANCED PRACTICE MIDWIFE

## 2023-11-30 NOTE — ASSESSMENT & PLAN NOTE
- reviewed s/s labor, FKC  - taking FE supplement and PNV  - GBS negative  - desires to wait for spontaneous labor  - next visit 1 week

## 2023-11-30 NOTE — PROGRESS NOTES
Routine Prenatal Visit  [facility] [address] [place] [address]    Assessment/Plan:  Kayce Abdi is a 32y.o. year old  at 38w9d who presents for routine prenatal visit. 1. Traumatic injury during pregnancy in third trimester    2. Iron deficiency    3. Third trimester pregnancy    4. 37 weeks gestation of pregnancy  Assessment & Plan:  - reviewed s/s labor, FKC  - taking FE supplement and PNV  - GBS negative  - desires to wait for spontaneous labor  - next visit 1 week            Subjective:     CC: Prenatal care    Areli Tapia is a 32 y.o.  female who presents for routine prenatal care at 37w6d. Pregnancy ROS: no leakage of fluid, pelvic pain, or vaginal bleeding. Good fetal movement. Notes contractions and pressure- 1/50/-3. Desires to wait for spontaneous labor    The following portions of the patient's history were reviewed and updated as appropriate: allergies, current medications, past family history, past medical history, obstetric history, gynecologic history, past social history, past surgical history and problem list.      Objective:  BP 98/70   Wt 71.6 kg (157 lb 14.4 oz)   LMP  (LMP Unknown)   BMI 27.97 kg/m²   Pregravid Weight/BMI: 56.7 kg (125 lb) (BMI 22.15)  Current Weight: 71.6 kg (157 lb 14.4 oz)   Total Weight Gain: 14.9 kg (32 lb 14.4 oz)   Pre-Gael Vitals      Flowsheet Row Most Recent Value   Prenatal Assessment    Fetal Heart Rate 154   Movement Present   Prenatal Vitals    Blood Pressure 98/70   Weight - Scale 71.6 kg (157 lb 14.4 oz)   Urine Albumin/Glucose    Dilation/Effacement/Station    Cervical Dilation 1   Cervical Effacement 50   Fetal Station -3   Vaginal Drainage    Draining Fluid No   Edema    LLE Edema None   RLE Edema None             General: Well appearing, no distress  Respiratory: Unlabored breathing  Cardiovascular: Regular rate.   Abdomen: Soft, gravid, nontender  Fundal Height: Appropriate for gestational age.  Extremities: Warm and well perfused. Non tender.

## 2023-12-11 ENCOUNTER — TELEPHONE (OUTPATIENT)
Dept: OBGYN CLINIC | Facility: MEDICAL CENTER | Age: 28
End: 2023-12-11

## 2023-12-13 PROBLEM — Z3A.39 39 WEEKS GESTATION OF PREGNANCY: Status: ACTIVE | Noted: 2023-06-06

## 2023-12-15 ENCOUNTER — ROUTINE PRENATAL (OUTPATIENT)
Dept: OBGYN CLINIC | Facility: MEDICAL CENTER | Age: 28
End: 2023-12-15
Payer: COMMERCIAL

## 2023-12-15 VITALS — WEIGHT: 159.7 LBS | DIASTOLIC BLOOD PRESSURE: 70 MMHG | SYSTOLIC BLOOD PRESSURE: 110 MMHG | BODY MASS INDEX: 28.29 KG/M2

## 2023-12-15 DIAGNOSIS — Z3A.40 40 WEEKS GESTATION OF PREGNANCY: ICD-10-CM

## 2023-12-15 DIAGNOSIS — Z34.93 PRENATAL CARE IN THIRD TRIMESTER: Primary | ICD-10-CM

## 2023-12-15 PROCEDURE — 99215 OFFICE O/P EST HI 40 MIN: CPT | Performed by: NURSE PRACTITIONER

## 2023-12-15 NOTE — PROGRESS NOTES
Denies loss of fluid, vaginal bleeding and regular contractions. Having episodes of irregular contractions. Confirms frequent fetal movement. Doing fetal kick counts. Tolerating prenatal vitamin and iron well. Desires spontaneous labor. BP: 110/70 weight:+34lbs SVE 3/70/-2, tolerated well  Plan  -Continue prenatal vitamins daily  -Continue fetal kick counts daily  -Continue vaginal/perineal massage 1-4 times per week  -Desires spontaneous labor. Reviewed recommendation for induction post EDC. Patient will schedule appointment for next week in office for NST/ROBERTA. -Anemia in pregnancy continue iron as ordered  -Common discomforts of pregnancy and precautions reviewed. Signs and symptoms of labor reviewed.   Patient verbalized understanding she may have vaginal bleeding after exam today  RTO 1 week NST/ ROBERTA for post [facility]

## 2023-12-18 ENCOUNTER — TELEPHONE (OUTPATIENT)
Dept: OBGYN CLINIC | Facility: MEDICAL CENTER | Age: 28
End: 2023-12-18

## 2023-12-18 DIAGNOSIS — E61.1 IRON DEFICIENCY: ICD-10-CM

## 2023-12-18 RX ORDER — FERROUS GLUCONATE 240(27)MG
TABLET ORAL
Qty: 90 TABLET | Refills: 1 | Status: SHIPPED | OUTPATIENT
Start: 2023-12-18 | End: 2023-12-20

## 2023-12-18 NOTE — TELEPHONE ENCOUNTER
----- Message from ELMER Adam sent at 12/18/2023  1:26 PM EST -----  Please call patient induction of labor was able to be scheduled for 12/19/23 at 8 AM.  Dr. Dawn is on.    Thank you

## 2023-12-19 ENCOUNTER — HOSPITAL ENCOUNTER (INPATIENT)
Facility: HOSPITAL | Age: 28
LOS: 1 days | Discharge: HOME | End: 2023-12-20
Attending: STUDENT IN AN ORGANIZED HEALTH CARE EDUCATION/TRAINING PROGRAM | Admitting: STUDENT IN AN ORGANIZED HEALTH CARE EDUCATION/TRAINING PROGRAM
Payer: COMMERCIAL

## 2023-12-19 ENCOUNTER — HOSPITAL ENCOUNTER (OUTPATIENT)
Dept: LABOR AND DELIVERY | Facility: HOSPITAL | Age: 28
Discharge: HOME | End: 2023-12-19
Payer: COMMERCIAL

## 2023-12-19 LAB
ABO GROUP BLD: NORMAL
BASE EXCESS BLDCOA CALC-SCNC: -0.5 MMOL/L (ref 3–11)
BASE EXCESS BLDCOV CALC-SCNC: -1.4 MMOL/L (ref 1–9)
BLD GP AB SCN SERPL QL: NEGATIVE
ERYTHROCYTE [DISTWIDTH] IN BLOOD BY AUTOMATED COUNT: 48.4 % (ref 11.6–15.1)
HCO3 BLDCOA-SCNC: 26.1 MMOL/L (ref 17.3–27.3)
HCO3 BLDCOV-SCNC: 23.6 MMOL/L (ref 12.2–28.6)
HCT VFR BLD AUTO: 38.5 % (ref 34.8–46.1)
HGB BLD-MCNC: 12.8 G/DL (ref 11.5–15.4)
HOLD SPECIMEN: YES
MCH RBC QN AUTO: 32 PG (ref 26.8–34.3)
MCHC RBC AUTO-ENTMCNC: 33.2 G/DL (ref 31.4–37.4)
MCV RBC AUTO: 96 FL (ref 82–98)
O2 CT VFR BLDCOA CALC: 8 ML/DL
OXYHGB MFR BLDCOA: 37.4 %
OXYHGB MFR BLDCOV: 68 %
PCO2 BLDCOA: 50.3 MM[HG] (ref 30–60)
PCO2 BLDCOV: 40.7 MM HG (ref 27–43)
PH BLDCOA: 7.33 [PH] (ref 7.23–7.43)
PH BLDCOV: 7.38 [PH] (ref 7.19–7.49)
PLATELET # BLD AUTO: 225 THOUSANDS/UL (ref 149–390)
PMV BLD AUTO: 10.4 FL (ref 8.9–12.7)
PO2 BLDCOA: 20 MM HG (ref 5–25)
PO2 BLDCOV: 31.9 MM HG (ref 15–45)
RBC # BLD AUTO: 4 MILLION/UL (ref 3.81–5.12)
RH BLD: POSITIVE
SAO2 % BLDCOV: 14.5 ML/DL
SPECIMEN EXPIRATION DATE: NORMAL
TREPONEMA PALLIDUM IGG+IGM AB [PRESENCE] IN SERUM OR PLASMA BY IMMUNOASSAY: NORMAL
WBC # BLD AUTO: 8.94 THOUSAND/UL (ref 4.31–10.16)

## 2023-12-19 PROCEDURE — 82805 BLOOD GASES W/O2 SATURATION: CPT | Performed by: STUDENT IN AN ORGANIZED HEALTH CARE EDUCATION/TRAINING PROGRAM

## 2023-12-19 PROCEDURE — 0HQ9XZZ REPAIR PERINEUM SKIN, EXTERNAL APPROACH: ICD-10-PCS | Performed by: STUDENT IN AN ORGANIZED HEALTH CARE EDUCATION/TRAINING PROGRAM

## 2023-12-19 PROCEDURE — 86901 BLOOD TYPING SEROLOGIC RH(D): CPT

## 2023-12-19 PROCEDURE — 3E033VJ INTRODUCTION OF OTHER HORMONE INTO PERIPHERAL VEIN, PERCUTANEOUS APPROACH: ICD-10-PCS | Performed by: STUDENT IN AN ORGANIZED HEALTH CARE EDUCATION/TRAINING PROGRAM

## 2023-12-19 PROCEDURE — 85027 COMPLETE CBC AUTOMATED: CPT

## 2023-12-19 PROCEDURE — 86900 BLOOD TYPING SEROLOGIC ABO: CPT

## 2023-12-19 PROCEDURE — 10907ZC DRAINAGE OF AMNIOTIC FLUID, THERAPEUTIC FROM PRODUCTS OF CONCEPTION, VIA NATURAL OR ARTIFICIAL OPENING: ICD-10-PCS | Performed by: STUDENT IN AN ORGANIZED HEALTH CARE EDUCATION/TRAINING PROGRAM

## 2023-12-19 PROCEDURE — 59409 OBSTETRICAL CARE: CPT | Performed by: STUDENT IN AN ORGANIZED HEALTH CARE EDUCATION/TRAINING PROGRAM

## 2023-12-19 PROCEDURE — 86780 TREPONEMA PALLIDUM: CPT

## 2023-12-19 PROCEDURE — NC001 PR NO CHARGE: Performed by: STUDENT IN AN ORGANIZED HEALTH CARE EDUCATION/TRAINING PROGRAM

## 2023-12-19 PROCEDURE — 86850 RBC ANTIBODY SCREEN: CPT

## 2023-12-19 PROCEDURE — NC001 PR NO CHARGE: Performed by: NURSE PRACTITIONER

## 2023-12-19 RX ORDER — DIPHENHYDRAMINE HCL 25 MG
25 TABLET ORAL EVERY 6 HOURS PRN
Status: DISCONTINUED | OUTPATIENT
Start: 2023-12-19 | End: 2023-12-20 | Stop reason: HOSPADM

## 2023-12-19 RX ORDER — SODIUM CHLORIDE, SODIUM LACTATE, POTASSIUM CHLORIDE, CALCIUM CHLORIDE 600; 310; 30; 20 MG/100ML; MG/100ML; MG/100ML; MG/100ML
125 INJECTION, SOLUTION INTRAVENOUS CONTINUOUS
Status: DISCONTINUED | OUTPATIENT
Start: 2023-12-19 | End: 2023-12-19

## 2023-12-19 RX ORDER — SIMETHICONE 80 MG
80 TABLET,CHEWABLE ORAL 4 TIMES DAILY PRN
Status: DISCONTINUED | OUTPATIENT
Start: 2023-12-19 | End: 2023-12-20 | Stop reason: HOSPADM

## 2023-12-19 RX ORDER — ACETAMINOPHEN 325 MG/1
650 TABLET ORAL EVERY 4 HOURS PRN
Status: DISCONTINUED | OUTPATIENT
Start: 2023-12-19 | End: 2023-12-20 | Stop reason: HOSPADM

## 2023-12-19 RX ORDER — IBUPROFEN 600 MG/1
600 TABLET ORAL EVERY 6 HOURS
Status: DISCONTINUED | OUTPATIENT
Start: 2023-12-19 | End: 2023-12-20 | Stop reason: HOSPADM

## 2023-12-19 RX ORDER — OXYTOCIN/RINGER'S LACTATE 30/500 ML
1-30 PLASTIC BAG, INJECTION (ML) INTRAVENOUS
Status: DISCONTINUED | OUTPATIENT
Start: 2023-12-19 | End: 2023-12-19

## 2023-12-19 RX ORDER — CALCIUM CARBONATE 500 MG/1
1000 TABLET, CHEWABLE ORAL DAILY PRN
Status: DISCONTINUED | OUTPATIENT
Start: 2023-12-19 | End: 2023-12-20 | Stop reason: HOSPADM

## 2023-12-19 RX ORDER — BUPIVACAINE HYDROCHLORIDE 2.5 MG/ML
30 INJECTION, SOLUTION EPIDURAL; INFILTRATION; INTRACAUDAL ONCE AS NEEDED
Status: COMPLETED | OUTPATIENT
Start: 2023-12-19 | End: 2023-12-19

## 2023-12-19 RX ORDER — ONDANSETRON 2 MG/ML
4 INJECTION INTRAMUSCULAR; INTRAVENOUS EVERY 8 HOURS PRN
Status: DISCONTINUED | OUTPATIENT
Start: 2023-12-19 | End: 2023-12-20 | Stop reason: HOSPADM

## 2023-12-19 RX ORDER — DOCUSATE SODIUM 100 MG/1
100 CAPSULE, LIQUID FILLED ORAL 2 TIMES DAILY
Status: DISCONTINUED | OUTPATIENT
Start: 2023-12-19 | End: 2023-12-20 | Stop reason: HOSPADM

## 2023-12-19 RX ORDER — BENZOCAINE/MENTHOL 6 MG-10 MG
1 LOZENGE MUCOUS MEMBRANE DAILY PRN
Status: DISCONTINUED | OUTPATIENT
Start: 2023-12-19 | End: 2023-12-20 | Stop reason: HOSPADM

## 2023-12-19 RX ORDER — OXYTOCIN/RINGER'S LACTATE 30/500 ML
250 PLASTIC BAG, INJECTION (ML) INTRAVENOUS CONTINUOUS
Status: ACTIVE | OUTPATIENT
Start: 2023-12-19 | End: 2023-12-19

## 2023-12-19 RX ORDER — IBUPROFEN 600 MG/1
600 TABLET ORAL EVERY 6 HOURS SCHEDULED
Status: DISCONTINUED | OUTPATIENT
Start: 2023-12-19 | End: 2023-12-19

## 2023-12-19 RX ORDER — ONDANSETRON 2 MG/ML
4 INJECTION INTRAMUSCULAR; INTRAVENOUS EVERY 6 HOURS PRN
Status: DISCONTINUED | OUTPATIENT
Start: 2023-12-19 | End: 2023-12-19

## 2023-12-19 RX ADMIN — IBUPROFEN 600 MG: 600 TABLET, FILM COATED ORAL at 14:30

## 2023-12-19 RX ADMIN — DOCUSATE SODIUM 100 MG: 100 CAPSULE, LIQUID FILLED ORAL at 17:35

## 2023-12-19 RX ADMIN — BENZOCAINE AND LEVOMENTHOL 1 APPLICATION: 200; 5 SPRAY TOPICAL at 14:30

## 2023-12-19 RX ADMIN — Medication 2 MILLI-UNITS/MIN: at 09:39

## 2023-12-19 RX ADMIN — IBUPROFEN 600 MG: 600 TABLET, FILM COATED ORAL at 20:21

## 2023-12-19 RX ADMIN — BUPIVACAINE HYDROCHLORIDE 30 ML: 2.5 INJECTION, SOLUTION EPIDURAL; INFILTRATION; INTRACAUDAL at 13:14

## 2023-12-19 RX ADMIN — SODIUM CHLORIDE, SODIUM LACTATE, POTASSIUM CHLORIDE, AND CALCIUM CHLORIDE 125 ML/HR: .6; .31; .03; .02 INJECTION, SOLUTION INTRAVENOUS at 09:39

## 2023-12-19 NOTE — L&D DELIVERY NOTE
"Vaginal Delivery Summary - OB/GYN   Kerri Wade 28 y.o. female MRN: [id number]  Unit/Bed#: L&D 323-01 Encounter: [id number]    Pre-delivery Diagnosis:   Pregnancy at 40 weeks gestation   Hepatitis B Non-Immune    Post-delivery Diagnosis:   Same, delivered    Procedure: Spontaneous Vaginal Delivery     Attending Physician: Dr. Teresa Dawn  Resident Physician: Dr. Sisi Pimentel    Anesthesia: Local    QBL: 37cc  Admission H.8 g/dL  Admission platelets: 225 thousands/uL    Complications: none apparent    Specimens:   1. Arterial and venous cord gases  2. Cord blood  3. Segment of umbilical cord  4. Placenta to storage     Findings:  1. Viable female on 2023 at 1:06 PM , with APGARS of 9  and 9  at 1 and 5 minutes respectively. Weight pending at time of dictation for skin to skin bonding.  2. Spontaneous delivery of intact placenta at 1311  3. 1 degree laceration repaired with 4-0 Vicryl Rapide    Gases:  Umbilical Artery  Recent Labs     23  1307   PHCART 7.333   BECART -0.5*       Umbilical Vein  No results for input(s): \"PHCVEN\", \"BECVEN\" in the last 72 hours.      Brief history and labor course:  Kerri Wade is now a 28 y.o. [id number] 40w4d who presented to labor and delivery for elective induction of labor. Her pregnancy was uncomplicated. On exam, she was noted to be 3/50/-3.  She was induced with pitocin. Amniotomy was performed for thin meconium stained fluid. She progressed to complete cervical dilation and began pushing.    Description of delivery:     Kerri delivered a viable female , wt pending as mother is doing skin to skin bonding. The fetal vertex delivered MOA position spontaneously. There was no nuchal cord. The anterior shoulder delivered atraumatically with maternal expulsive forces and the assistance of gentle downward traction. The posterior shoulder delivered with maternal expulsive forces and the assistance of gentle upward traction. The remainder of the fetus delivered " spontaneously.     Upon delivery, the infant was placed on the mothers abdomen and the cord was doubly clamped and cut. Delayed cord clamping was achieved.The infant was noted to cry spontaneously and was moving all extremities appropriately. There was no evidence for injury. Nurse resuscitators evaluating the . Arterial and venous cord blood gases and cord blood was collected for analysis. These were promptly sent to the lab. In the immediate post-partum, active management of the 3rd stage of labor was performed with massage, the administration of 30 units of IV pitocin, and gentle traction on the umbilical cord. The placenta delivered spontaneously and was noted to have a centrally inserted 3 vessel cord.  The placenta was sent to storage.    The vagina, cervix, perineum, and rectum were inspected and there was noted to be a 1st degree laceration.    Laceration Repair    4mL of bupivicaine were injected at the laceration. Two interrupted sutures of 4-0 vicryl rapide were placed to re-approximate the skin.     The fundus was firm and at the level of the umbilicus.  At the conclusion of the procedure, all needle, sponge, and instrument counts were noted to be correct. Patient tolerated the procedure well and was allowed to recover in labor and delivery room with family and  before being transferred to the post-partum floor. Dr. Dawn was present and participated in all key portions of the case.    Disposition:  The patient and the  both tolerated the procedure well and are recovering in labor and delivery room       Sisi Pimentel MD  OB/GYN PGY-1  2023  1:35 PM

## 2023-12-19 NOTE — DISCHARGE SUMMARY
Obstetrics Discharge Summary  Kerri Wade 28 y.o. female MRN: [id number]  Unit/Bed#: L&D 323-01 Encounter: [id number]    Admission Date: 2023     Discharge Date: 23    Admitting Attending: López  Delivery Attending: López  Discharging Attending: Ana M    Admitting Diagnoses:   Pregnancy at 40w  Hepatitis B nonimmune    Discharge Diagnoses:   Same, delivered  Lactating mother   Hepatitis B non-immune ( declined immunization)    Delivery  Route of Delivery: Vaginal, Spontaneous     Anesthesia:  local   QBL:      37ml    Delivery: Vaginal, Spontaneous  at 2023  1:06 PM   1 degree perineal laceration with   repair.      Baby's Weight:  ;      Apgar scores: 9  and 9  at 1 and 5 minutes, respectively      Hospital course: Kerri Wade is now a 28 y.o.  who was initially admitted at 40w4d for elective induction of labor. At presentation, SVE was 3/50/-3. Pitocin was started. Amniotomy was performed for meconium-stained fluid. She progressed to complete cervical dilation and began pushing.    Her post-delivery course was uncomplicated. Her postpartum pain was well controlled with oral analgesics. Maternal blood type is O positive so RhoGAM was not indicated.    On day of discharge, she was ambulating and able to reasonably perform all ADLs. She was voiding and had appropriate bowel function. Pain was well controlled. She was discharged home on postpartum day #1 without complications. Patient was instructed to follow up with her OBGYN as an outpatient and was given appropriate warnings to call provider if she develops signs of infection or uncontrolled pain.    Complications: none apparent    Condition at discharge: good     Provisions for Follow-Up Care:  Please see after visit summary for information related to follow-up care and any pertinent home health orders.      Disposition: Home    Planned Readmission: No    Discharge Medications:   Please see AVS for a complete list of discharge  medications.    Discharge instructions :   Please see AVS for complete discharge instructions.

## 2023-12-19 NOTE — PLAN OF CARE

## 2023-12-19 NOTE — H&P
H&P Exam - Obstetrics   Kerri Wade 28 y.o. female MRN: [id number]  Unit/Bed#: L&D 323-01 Encounter: [id number]        Assessment/Plan:  Kerri Wade is a 28 y.o.  who presents for elective labor induction at 40w4d.     Labor Induction  - SVE 3/50/-3  - EFW 7.5-8lbs  - Vertex by ultrasound  - FHT category I  - GBS negative  - Plan for oxytocin then AROM.       History of Present Illness   Chief Complaint: labor induction  Group: OCA    HPI:  Kerri Wade is a 28 y.o.  female with an TARIK of 12/15/2023, by Ultrasound at 40w4d weeks gestation who presents with:    Pregnancy complications: Non immune to hepatitis B  Pregnancy Problems (from 23 to present)       Problem Noted Resolved    40 weeks gestation of pregnancy 2023 by ELMER Adam No    Prenatal care in third trimester 2023 by ELMER Adam No            Review of Systems   Constitutional:  Negative for appetite change, fatigue, fever and unexpected weight change.   HENT:  Negative for congestion and rhinorrhea.    Eyes:  Negative for visual disturbance.   Respiratory:  Negative for cough and shortness of breath.    Cardiovascular:  Negative for chest pain and palpitations.   Gastrointestinal:  Negative for abdominal distention, abdominal pain, blood in stool, constipation, diarrhea, nausea and vomiting.   Endocrine: Negative for cold intolerance and heat intolerance.   Genitourinary:  Negative for dysuria, flank pain, hematuria, pelvic pain, urgency, vaginal bleeding and vaginal discharge.   Musculoskeletal:  Negative for back pain and myalgias.   Skin:  Negative for rash.   Neurological:  Negative for weakness and headaches.   Hematological:  Negative for adenopathy. Does not bruise/bleed easily.   Psychiatric/Behavioral:  Negative for dysphoric mood and suicidal ideas. The patient is not nervous/anxious.        Historical Information   OB History    Para Term  AB Living   3 2 2 0 0 2   SAB IAB Ectopic  Multiple Live Births           2      # Outcome Date GA Lbr Ulisses/2nd Weight Sex Delivery Anes PTL Lv   3 Current            2 Term 06/05/19 40w0d  3771 g (8 lb 5 oz) M Vag-Spont   ELIJAH   1 Term 12/11/16 39w0d  3317 g (7 lb 5 oz) M Vag-Spont  N ELIJAH       PFSH: The following portions of the patient's history were reviewed and updated as appropriate: allergies, current medications, past family history, past medical history, obstetric history, gynecologic history, past social history, past surgical history and problem list.       Physical Exam  Constitutional:       Appearance: Normal appearance.   HENT:      Head: Normocephalic and atraumatic.      Mouth/Throat:      Mouth: Mucous membranes are moist.      Pharynx: Oropharynx is clear. No oropharyngeal exudate.   Cardiovascular:      Rate and Rhythm: Normal rate and regular rhythm.      Pulses: Normal pulses.      Heart sounds: Normal heart sounds. No murmur heard.     No friction rub. No gallop.   Pulmonary:      Effort: Pulmonary effort is normal. No respiratory distress.      Breath sounds: Normal breath sounds. No wheezing, rhonchi or rales.   Abdominal:      General: Abdomen is flat. There is no distension.      Palpations: Abdomen is soft. There is no mass.      Tenderness: There is no abdominal tenderness.      Hernia: No hernia is present.      Comments: Gravid   Genitourinary:     Comments: SVE 3/50/-3 posterior soft  Musculoskeletal:         General: No deformity or signs of injury. Normal range of motion.      Right lower leg: No edema.      Left lower leg: No edema.   Skin:     General: Skin is warm and dry.   Neurological:      General: No focal deficit present.      Mental Status: She is alert and oriented to person, place, and time.   Psychiatric:         Mood and Affect: Mood normal.         Behavior: Behavior normal.         Cervical Exam     Contractions:  Contraction Intensity: Mild  Fetal heart rate  FHR Category: Category I  Prenatal Labs: I have  personally reviewed pertinent reports.     GBS negative  Normal prenatal panel  Normal glucola    Imaging, EKG, Pathology, and Other Studies: I have personally reviewed pertinent reports.      Teresa Dawn MD  [facility]  [facility] [address] [facility]  12/19/2023 9:31 AM

## 2023-12-19 NOTE — OB LABOR/OXYTOCIN SAFETY PROGRESS
Oxytocin Safety Progress Check Note - Kerri Wade 28 y.o. female MRN: [id number]    Unit/Bed#: L&D 323-01 Encounter: [id number]    Dose (trinity-units/min) Oxytocin: 2 trinity-units/min  Contraction Frequency (minutes): 3-4  Contraction Intensity: Moderate  Uterine Activity Characteristics: Irritability  Cervical Dilation: 6-7        Cervical Effacement: 80  Fetal Station: -2  Baseline Rate (FHR): 135 bpm  Fetal Heart Rate (FHT): 130 BPM  FHR Category: 1               Vital Signs:   Vitals:    12/19/23 1140   BP: 111/68   Pulse: 80   Resp:    Temp:        Notes/comments:   SVE as above. AROM for thin mec. Patient uncomfortable with contractions and is not planning for epidural. Continue pitocin titration. D/w Dr. Dawn.      Sisi Pimentel MD 12/19/2023 12:03 PM

## 2023-12-19 NOTE — OB LABOR/OXYTOCIN SAFETY PROGRESS
Oxytocin Safety Progress Check Note - Kerri Wade 28 y.o. female MRN: [id number]    Unit/Bed#: L&D 323-01 Encounter: [id number]    Dose (trinity-units/min) Oxytocin: 4 trinity-units/min  Contraction Frequency (minutes): 3.4  Contraction Intensity: Moderate  Uterine Activity Characteristics: Irritability  Cervical Dilation: 3        Cervical Effacement: 50  Fetal Station: -3  Baseline Rate (FHR): 135 bpm  Fetal Heart Rate (FHT): 130 BPM  FHR Category: I               Vital Signs:   Vitals:    12/19/23 1016   BP: 93/60   Pulse: 74   Resp:    Temp:        Notes/comments:   Patient was ambulating back from bathroom when maternal heart rate was inadvertently picked up by fetal monitor, once Kerri laid back down in bed fetal heart monitor was moved and fetal heart rate at baseline 120 was picked up again. Kerri is beginning to feel uncomfortable with contractions. She would prefer to defer cervical exam at this time; will plan to check in 1-2 hrs. FHR category I. Continue pitocin titration.    Maryjane Oseguera MD 12/19/2023 11:20 AM

## 2023-12-20 VITALS
DIASTOLIC BLOOD PRESSURE: 70 MMHG | WEIGHT: 160.94 LBS | SYSTOLIC BLOOD PRESSURE: 112 MMHG | HEIGHT: 63 IN | HEART RATE: 68 BPM | RESPIRATION RATE: 16 BRPM | BODY MASS INDEX: 28.52 KG/M2 | TEMPERATURE: 98.2 F | OXYGEN SATURATION: 97 %

## 2023-12-20 LAB
DME PARACHUTE DELIVERY DATE ACTUAL: NORMAL
DME PARACHUTE DELIVERY DATE REQUESTED: NORMAL
DME PARACHUTE ITEM DESCRIPTION: NORMAL
DME PARACHUTE ORDER STATUS: NORMAL
DME PARACHUTE SUPPLIER NAME: NORMAL
DME PARACHUTE SUPPLIER PHONE: NORMAL

## 2023-12-20 PROCEDURE — 99024 POSTOP FOLLOW-UP VISIT: CPT | Performed by: NURSE PRACTITIONER

## 2023-12-20 RX ORDER — LORAZEPAM 2 MG/ML
INJECTION INTRAMUSCULAR
Status: DISCONTINUED
Start: 2023-12-20 | End: 2023-12-20 | Stop reason: WASHOUT

## 2023-12-20 RX ORDER — IBUPROFEN 600 MG/1
600 TABLET ORAL EVERY 6 HOURS
Qty: 30 TABLET | Refills: 0 | Status: SHIPPED | OUTPATIENT
Start: 2023-12-20

## 2023-12-20 RX ORDER — BENZOCAINE/MENTHOL 6 MG-10 MG
1 LOZENGE MUCOUS MEMBRANE DAILY PRN
Start: 2023-12-20

## 2023-12-20 RX ORDER — ACETAMINOPHEN 325 MG/1
650 TABLET ORAL EVERY 4 HOURS PRN
Qty: 30 TABLET | Refills: 0 | Status: SHIPPED | OUTPATIENT
Start: 2023-12-20

## 2023-12-20 RX ADMIN — WITCH HAZEL 1 PAD: 500 SOLUTION RECTAL; TOPICAL at 10:23

## 2023-12-20 RX ADMIN — DOCUSATE SODIUM 100 MG: 100 CAPSULE, LIQUID FILLED ORAL at 09:41

## 2023-12-20 RX ADMIN — IBUPROFEN 600 MG: 600 TABLET, FILM COATED ORAL at 16:51

## 2023-12-20 RX ADMIN — IBUPROFEN 600 MG: 600 TABLET, FILM COATED ORAL at 09:41

## 2023-12-20 NOTE — NURSING NOTE
Discharge education reviewed with patient. Save your life magnet given and explained. Patient verbalizes understanding of teaching. All questions answered at this time.

## 2023-12-20 NOTE — PLAN OF CARE

## 2023-12-20 NOTE — PLAN OF CARE
Problem: PAIN - ADULT  Goal: Verbalizes/displays adequate comfort level or baseline comfort level  Description: Interventions:  - Encourage patient to monitor pain and request assistance  - Assess pain using appropriate pain scale  - Administer analgesics based on type and severity of pain and evaluate response  - Implement non-pharmacological measures as appropriate and evaluate response  - Consider cultural and social influences on pain and pain management  - Notify physician/advanced practitioner if interventions unsuccessful or patient reports new pain  Outcome: Progressing     Problem: INFECTION - ADULT  Goal: Absence or prevention of progression during hospitalization  Description: INTERVENTIONS:  - Assess and monitor for signs and symptoms of infection  - Monitor lab/diagnostic results  - Monitor all insertion sites, i.e. indwelling lines, tubes, and drains  - Monitor endotracheal if appropriate and nasal secretions for changes in amount and color  - [place] appropriate cooling/warming therapies per order  - Administer medications as ordered  - Instruct and encourage patient and family to use good hand hygiene technique  - Identify and instruct in appropriate isolation precautions for identified infection/condition  Outcome: Progressing  Goal: Absence of fever/infection during neutropenic period  Description: INTERVENTIONS:  - Monitor WBC    Outcome: Progressing     Problem: SAFETY ADULT  Goal: Patient will remain free of falls  Description: INTERVENTIONS:  - Educate patient/family on patient safety including physical limitations  - Instruct patient to call for assistance with activity   - Consult OT/PT to assist with strengthening/mobility   - Keep Call bell within reach  - Keep bed low and locked with side rails adjusted as appropriate  - Keep care items and personal belongings within reach  Outcome: Progressing  Goal: Maintain or return to baseline ADL function  Description: INTERVENTIONS:  -  Assess  patient's ability to carry out ADLs; assess patient's baseline for ADL function and identify physical deficits which impact ability to perform ADLs (bathing, care of mouth/teeth, toileting, grooming, dressing, etc.)  - Assess/evaluate cause of self-care deficits   - Assess range of motion  - Assess patient's mobility; develop plan if impaired  - Assess patient's need for assistive devices and provide as appropriate  - Encourage maximum independence but intervene and supervise when necessary  - Involve family in performance of ADLs  - Assess for home care needs following discharge   - Consider OT consult to assist with ADL evaluation and planning for discharge  - Provide patient education as appropriate  Outcome: Progressing  Goal: Maintains/Returns to pre admission functional level  Description: INTERVENTIONS:  - Perform AM-PAC 6 Click Basic Mobility/ Daily Activity assessment daily.  - Set and communicate daily mobility goal to care team and patient/family/caregiver.   - Collaborate with rehabilitation services on mobility goals if consulted  - Record patient progress and toleration of activity level   Outcome: Progressing     Problem: Knowledge Deficit  Goal: Patient/family/caregiver demonstrates understanding of disease process, treatment plan, medications, and discharge instructions  Description: Complete learning assessment and assess knowledge base.  Interventions:  - Provide teaching at level of understanding  - Provide teaching via preferred learning methods  Outcome: Progressing     Problem: DISCHARGE PLANNING  Goal: Discharge to home or other facility with appropriate resources  Description: INTERVENTIONS:  - Identify barriers to discharge w/patient and caregiver  - Arrange for needed discharge resources and transportation as appropriate  - Identify discharge learning needs (meds, wound care, etc.)  - Arrange for interpretive services to assist at discharge as needed  - Refer to Case Management Department  for coordinating discharge planning if the patient needs post-hospital services based on physician/advanced practitioner order or complex needs related to functional status, cognitive ability, or social support system  Outcome: Progressing

## 2023-12-20 NOTE — LACTATION NOTE
This note was copied from a baby's chart.  CONSULT - LACTATION  Baby Girl (Merry Wade 1 days female MRN: [id number]    [facility] Room / Bed: L&D 311(N)/L&D 311(N) Encounter: [id number]    Maternal Information     MOTHER:  Kerri Wdae  Maternal Age: 28 y.o.   OB History: # 1 - Date: 16, Sex: Male, Weight: 3317 g (7 lb 5 oz), GA: 39w0d, Delivery: Vaginal, Spontaneous, Apgar1: None, Apgar5: None, Living: Living, Birth Comments: None    # 2 - Date: 19, Sex: Male, Weight: 3771 g (8 lb 5 oz), GA: 40w0d, Delivery: Vaginal, Spontaneous, Apgar1: None, Apgar5: None, Living: Living, Birth Comments: None    # 3 - Date: 23, Sex: Female, Weight: 3805 g (8 lb 6.2 oz), GA: 40w4d, Delivery: Vaginal, Spontaneous, Apgar1: 9, Apgar5: 9, Living: Living, Birth Comments: None   Previouse breast reduction surgery? No    Lactation history:   Has patient previously breast fed: Yes   How long had patient previously breast fed: 1.5 and 2 years   Previous breast feeding complications:       Past Surgical History:   Procedure Laterality Date    NO PAST SURGERIES          Birth information:  YOB: 2023   Time of birth: 1:06 PM   Sex: female   Delivery type: Vaginal, Spontaneous   Birth Weight: 3805 g (8 lb 6.2 oz)   Percent of Weight Change: -2%     Gestational Age: 40w4d   [unfilled]    Assessment     Breast and nipple assessment:  not observed    [place] Assessment:  not observed    Feeding assessment: feeding well per family      Breasts/Nipples   Breastfeeding Status Yes   Breastfeeding Progress Breastfeeding well  (per family)   Breast Pump   Pump 3  (TREMAINE consult for Ally POLANCO)   Patient Follow-Up   Lactation Consult Status 2   Follow-Up Type Inpatient;Call as needed   Other OB Lactation Documentation    Additional Problem Noted Experienced breastfeeding mom with no needs identified or verbalized by family  (Declines RSB. Reviewed D/C booklet)     Feeding  recommendations:  breast feed on demand    Information on hand expression given. Discussed benefits of knowing how to manually express breast including stimulating milk supply, softening nipple for latch and evacuating breast in the event of engorgement.    Met with mother to go over discharge breastfeeding booklet including the feeding log. Emphasized 8 or more (12) feedings in a 24 hour period, what to expect for the number of diapers per day of life and the progression of properties of the  stooling pattern.    List of reasons to call a lactation consultant.  Feeding logs  Feeding cues  Hand expression  Baby's Second day (cluster feeding)  Breastfeeding and Your Lifestyle (Medications, Alcohol, Caffeine, Smoking, Street Drugs, Methadone)  First Two Weeks Survival Guide for Breastfeeding  Breast Changes  Physical Therapy  Storage and Handling of Breast milk  How to Keep Your Breast Pump Kit Clean  The Employed Breastfeeding Mother  Mixed feeding  Bottle feeding like breastfeeding (paced bottle feeding)  astfeeding and your lifestyle, storage and preparation of breast milk, how to keep you breast pump clean, the employed breastfeeding mother and paced bottle feeding handouts.     Booklet included Breastfeeding Resources for after discharge including access to the number for the [facility].    Encouraged parents to call for assistance, questions, and concerns about breastfeeding.  Extension provided.      Kristi Edmondson RN 2023 1:09 PM

## 2023-12-20 NOTE — PROGRESS NOTES
Progress Note - OB/GYN  Post-Partum Physician Note   Kerri Wade 28 y.o. female MRN: [id number]  Unit/Bed#: L&D 311-01 Encounter: [id number]    Patient is post-partum day 1 from a Spontaneous vaginal delivery.      Brief OB review:    Elective IOL at post EDC.       Pain: no  Tolerating Oral Intake: yes  Voiding: yes  Flatus: yes  Bowel Movement: no  Ambulating: yes  Breastfeeding: Breastfeeding  Chest Pain: no  Shortness of Breath: no  Leg Pain/Discomfort: no  Lochia: moderate    Objective:   Vitals:    23 0700   BP: 101/53   Pulse: 57   Resp: 16   Temp: 97.9 °F (36.6 °C)       Intake/Output Summary (Last 24 hours) at 2023 0827  Last data filed at 2023 1900  Gross per 24 hour   Intake --   Output 437 ml   Net -437 ml       Physical Exam:  General: in no apparent distress and well developed and well nourished  Cardiovascular: Cor RRR  Lungs: clear to auscultation bilaterally  Abdomen: abdomen is soft without significant tenderness, masses, organomegaly or guarding  Fundus: non-tender and appropriately tender to palpation, at he umbilicus  Lower extremeties: nontender  Other: bonding well, breastfeeding well     Labs/Tests:   NA        MEDS:   Current Facility-Administered Medications   Medication Dose Route Frequency    acetaminophen (TYLENOL) tablet 650 mg  650 mg Oral Q4H PRN    benzocaine-menthol-lanolin-aloe (DERMOPLAST) 20-0.5 % topical spray 1 Application  1 Application Topical Q6H PRN    calcium carbonate (TUMS) chewable tablet 1,000 mg  1,000 mg Oral Daily PRN    diphenhydrAMINE (BENADRYL) tablet 25 mg  25 mg Oral Q6H PRN    docusate sodium (COLACE) capsule 100 mg  100 mg Oral BID    hepatitis B vac (recombinant) IM injection 20 mcg  1 mL Intramuscular Once    hydrocortisone 1 % cream 1 Application  1 Application Topical Daily PRN    ibuprofen (MOTRIN) tablet 600 mg  600 mg Oral Q6H    ondansetron (ZOFRAN) injection 4 mg  4 mg Intravenous Q8H PRN    simethicone (MYLICON) chewable  tablet 80 mg  80 mg Oral 4x Daily PRN    witch hazel-glycerin (TUCKS) topical pad 1 Pad  1 Pad Topical Q4H PRN     Invasive Devices       Peripheral Intravenous Line  Duration             Peripheral IV 23 Dorsal (posterior);Left Forearm <1 day                    Assessment and Plan:  28 y.o. year-old , postpartum day 1,      DC home today   Continue routine postpartum care  Encourage ambulation  Advance diet as tolerated  Lactation support

## 2023-12-20 NOTE — LACTATION NOTE
This note was copied from a baby's chart.     12/20/23 1300   Lactation Consultation   Reason for Consult 20 min   Lactation Consultant Total Time 20   LATCH Documentation   Latch 2   Audible Swallowing 2   Type of Nipple 2   Comfort (Breast/Nipple) 2   Hold (Positioning) 1   LATCH Score 9   Having latch problems? No   Breasts/Nipples   Left Breast Soft   Right Breast Soft   Left Nipple Everted   Right Nipple Everted   Intervention Hand expression   Breastfeeding Progress Breastfeeding well   Patient Follow-Up   Lactation Consult Status 2   Follow-Up Type Inpatient;Call as needed   Other OB Lactation Documentation    Additional Problem Noted c/o difficutly latching baby to the breast. Demonstrated good alignment for latching. infant was able to sustain latch at the breast.  (Mang?rKart latching video provided in native language: Turkish.)     Bring baby to the breast so that her  lower lip and chin touch the breast with her nose at the nipple so that  her lower lip is in contact with the circumference of the areola rather than the base of the nipple.    Encouraged parents to call for assistance, questions, and concerns about breastfeeding.  Extension provided.

## 2023-12-20 NOTE — CASE MANAGEMENT
Case Management Progress Note    Patient name Kerri Wade  [facility] L&D 311/L&D 311-01 MRN [id number]  : 1995 Date 2023       LOS (days): 1  Geometric Mean LOS (GMLOS) (days):   Days to GMLOS:        OBJECTIVE:        Current admission status: Inpatient  Preferred Pharmacy:   [id number] [place] [facility] [address] [address]  Phone: [phone] Fax: [phone]    Primary Care Provider: Thelma Catalan DO    Primary Insurance: [employer]  Secondary Insurance:     PROGRESS NOTE:    CM received consult for breast pump. MOB requesting Zomee Z2 pump. CM placed order via [employer]. Order approved. [employer] to send out pump for shipping today.

## 2023-12-21 NOTE — PROGRESS NOTES
All belongings accounted for by patient and S.O. before leaving. Discharge instructions given and reviewed, questions answered. Mother chose to walk from unit escorted by S.OKashmir carrying infant secured in car seat and escorted by VIRY Burns

## 2023-12-21 NOTE — UTILIZATION REVIEW
"Mother and baby discharged on 2023         NOTIFICATION OF INPATIENT ADMISSION   MATERNITY/DELIVERY AUTHORIZATION REQUEST   SERVICING FACILITY:   [facility] - L&D, , NICU  [address] [place] [address]  Tax ID: [id number]  NPI: [id number] ATTENDING PROVIDER:  Attending Name and NPI#: Teresa Dawn Md [[id number]]  Address: [address] [place] [address]  Phone: [phone]     ADMISSION INFORMATION:  Place of Service: Inpatient [facility]  Place of Service Code: 21  Inpatient Admission Date/Time: 23  8:32 AM  Discharge Date/Time: 2023  5:41 PM  Admitting Diagnosis Code/Description:  Encounter for induction of labor [Z34.90]   Mother: Kerri Wade 1995 Estimated Date of Delivery: 12/15/23  Delivering clinician: Teresa Dawn    OB History          3    Para   3    Term   3       0    AB   0    Living   3         SAB        IAB        Ectopic        Multiple   0    Live Births   3               [place] Name & MRN:   Information for the patient's :  Sheri, Baby Girl (Kerri) [[id number]]   [place] Delivery Information:  Sex: female  Delivered 2023 1:06 PM by Vaginal, Spontaneous; Gestational Age: 40w4d    [place] Measurements:  Weight: 8 lb 6.2 oz (3805 g);  Height: 20\"    APGAR 1 minute 5 minutes 10 minutes   Totals: 9 9       Birth Information: 28 y.o. female MRN: [id number] Unit/Bed#: L&D 311-01   Birthweight: No birth weight on file. Gestational Age: <None> Delivery Type:    APGARS Totals:        UTILIZATION REVIEW CONTACT:  Stephanie Mabry, Utilization   Network Utilization Review Department  Phone: [phone]  Fax [phone]  Email: [email or web address]  Contact for approvals/pending authorizations, clinical reviews, and discharge.   PHYSICIAN ADVISORY SERVICES:  Medical Necessity Denial & Xaxc-ex-Ptva Review  Phone: [phone]  Fax: " [phone]  Email: [email or web address]   DISCHARGE SUPPORT TEAM:  For Patients Discharge Needs & Updates  Phone: [phone] opt. [phone] Fax: [phone]  Email: [email or web address]

## 2023-12-27 LAB — PLACENTA IN STORAGE: NORMAL

## 2024-02-09 ENCOUNTER — POSTPARTUM VISIT (OUTPATIENT)
Dept: OBGYN CLINIC | Facility: MEDICAL CENTER | Age: 29
End: 2024-02-09
Payer: COMMERCIAL

## 2024-02-09 VITALS
DIASTOLIC BLOOD PRESSURE: 58 MMHG | SYSTOLIC BLOOD PRESSURE: 96 MMHG | BODY MASS INDEX: 23.88 KG/M2 | HEIGHT: 63 IN | WEIGHT: 134.8 LBS

## 2024-04-17 NOTE — PROGRESS NOTES
Iud insertions    Date/Time: 4/18/2024 2:30 PM    Performed by: Ana Viramontes MD  Authorized by: Ana Viramontes MD    Verbal consent obtained?: Yes    Written consent obtained?: Yes    Risks and benefits: Risks, benefits and alternatives were discussed    Consent given by:  Patient  Patient states understanding of procedure being performed: Yes    Patient's understanding of procedure matches consent: Yes    Procedure consent matches procedure scheduled: Yes    Required items: Required blood products, implants, devices and special equipment available    Patient identity confirmed:  Verbally with patient  Procedure:     Negative urine pregnancy test: yes      Cervix cleaned and prepped: yes      Speculum placed in vagina: yes      Tenaculum applied to cervix: yes      Uterus sounded: yes      Uterus sound depth (cm):  8    IUD inserted with no complications: yes      IUD type:  Mirena    Strings trimmed: yes    Post-procedure:     Patient tolerated procedure well: yes      Patient will follow up after next period: yes (1 month)

## 2024-04-18 ENCOUNTER — PROCEDURE VISIT (OUTPATIENT)
Dept: OBGYN CLINIC | Facility: MEDICAL CENTER | Age: 29
End: 2024-04-18
Payer: COMMERCIAL

## 2024-04-18 VITALS — DIASTOLIC BLOOD PRESSURE: 70 MMHG | WEIGHT: 134 LBS | BODY MASS INDEX: 23.74 KG/M2 | SYSTOLIC BLOOD PRESSURE: 102 MMHG

## 2024-04-18 DIAGNOSIS — Z30.430 ENCOUNTER FOR IUD INSERTION: Primary | ICD-10-CM

## 2024-04-18 LAB — SL AMB POCT URINE HCG: NEGATIVE

## 2024-04-18 PROCEDURE — 58300 INSERT INTRAUTERINE DEVICE: CPT | Performed by: OBSTETRICS & GYNECOLOGY

## 2024-04-18 PROCEDURE — 81025 URINE PREGNANCY TEST: CPT | Performed by: OBSTETRICS & GYNECOLOGY

## 2024-05-07 ENCOUNTER — OFFICE VISIT (OUTPATIENT)
Dept: FAMILY MEDICINE CLINIC | Facility: CLINIC | Age: 29
End: 2024-05-07
Payer: COMMERCIAL

## 2024-05-07 VITALS
WEIGHT: 134 LBS | HEIGHT: 62 IN | SYSTOLIC BLOOD PRESSURE: 100 MMHG | TEMPERATURE: 98.5 F | OXYGEN SATURATION: 99 % | HEART RATE: 90 BPM | DIASTOLIC BLOOD PRESSURE: 70 MMHG | BODY MASS INDEX: 24.66 KG/M2

## 2024-05-07 DIAGNOSIS — L65.9 THINNING HAIR: ICD-10-CM

## 2024-05-07 DIAGNOSIS — Z13.220 SCREENING FOR LIPID DISORDERS: ICD-10-CM

## 2024-05-07 DIAGNOSIS — Z00.01 ENCOUNTER FOR ROUTINE ADULT MEDICAL EXAM WITH ABNORMAL FINDINGS: Primary | ICD-10-CM

## 2024-05-07 DIAGNOSIS — Z13.29 SCREENING FOR THYROID DISORDER: ICD-10-CM

## 2024-05-07 PROBLEM — Z34.93 PRENATAL CARE IN THIRD TRIMESTER: Status: RESOLVED | Noted: 2023-06-06 | Resolved: 2024-05-07

## 2024-05-07 PROCEDURE — 99395 PREV VISIT EST AGE 18-39: CPT | Performed by: FAMILY MEDICINE

## 2024-05-07 PROCEDURE — 99204 OFFICE O/P NEW MOD 45 MIN: CPT | Performed by: FAMILY MEDICINE

## 2024-05-07 NOTE — ASSESSMENT & PLAN NOTE
New diagnosis symptomatic no balding spots patient recently has a baby 4 months old discussed with the patient change and effect of stress of the pregnancy on the here plan to check for thyroid rule out anemia check iron ferritin JC and vitamin D discussed to start taking biotin 2000 every day

## 2024-05-07 NOTE — PROGRESS NOTES
ADULT ANNUAL PHYSICAL  [facility] PRIMARY CARE [facility]    NAME: Kerri Wade  AGE: 28 y.o. SEX: female  : 1995     DATE: 2024     Assessment and Plan:     Problem List Items Addressed This Visit       Encounter for routine adult medical exam with abnormal findings - Primary     Advice and education were given regarding nutrition, aerobic exercises, weight-bearing exercises, cardiovascular risk reduction, fall risk reduction, and age-appropriate supplements.     The patient was counseled regarding instructions for management, risk factor reductions, prognosis, risks and benefits of treatment options, patient and family education, and importance of compliance with treatment.         Relevant Orders    CBC and differential    Basic metabolic panel    Thinning hair     New diagnosis symptomatic no balding spots patient recently has a baby 4 months old discussed with the patient change and effect of stress of the pregnancy on the here plan to check for thyroid rule out anemia check iron ferritin JC and vitamin D discussed to start taking biotin 2000 every day         Relevant Orders    CBC and differential    Basic metabolic panel    Ferritin    Iron    Vitamin B12    JC w/Reflex    Vitamin D 25 hydroxy     Other Visit Diagnoses       Screening for thyroid disorder        Relevant Orders    CBC and differential    TSH, 3rd generation with Free T4 reflex    Basic metabolic panel    Screening for lipid disorders        Relevant Orders    CBC and differential    Lipid Panel with Direct LDL reflex    Basic metabolic panel            Immunizations and preventive care screenings were discussed with patient today. Appropriate education was printed on patient's after visit summary.    Counseling:  Alcohol/drug use: discussed moderation in alcohol intake, the recommendations for healthy alcohol use, and avoidance of illicit drug use.  Dental Health: discussed  importance of regular tooth brushing, flossing, and dental visits.  Injury prevention: discussed safety/seat belts, safety helmets, smoke detectors, carbon dioxide detectors, and smoking near bedding or upholstery.  Sexual health: discussed sexually transmitted diseases, partner selection, use of condoms, avoidance of unintended pregnancy, and contraceptive alternatives.  Exercise: the importance of regular exercise/physical activity was discussed. Recommend exercise 3-5 times per week for at least 30 minutes.          Return in about 1 year (around 5/7/2025).     Chief Complaint:     Chief Complaint   Patient presents with    New Patient Visit    Physical Exam      History of Present Illness:     Adult Annual Physical   Patient here for a comprehensive physical exam. The patient reports problems - and patient concerned about thinning hair and she feels she been losing hair more than before it has been going on for a while no change in the shampoo she does not dry her hair and she does not expose her hair to heat as she recently had a 4-month-old baby past medical surgical family and social history reviewed with the patient .    Diet and Physical Activity  Diet/Nutrition:  No special diet .   Exercise: 3-4 times a week on average.      Depression Screening  PHQ-2/9 Depression Screening    Little interest or pleasure in doing things: 0 - not at all  Feeling down, depressed, or hopeless: 0 - not at all  PHQ-2 Score: 0  PHQ-2 Interpretation: Negative depression screen       General Health  Sleep: gets 4-6 hours of sleep on average.   Hearing: normal - bilateral.  Vision: wears glasses.   Dental: regular dental visits.       /GYN Health  Follows with gynecology? yes   Contraceptive method: IUD placement.  History of STDs?: no.     Advanced Care Planning  Do you have an advanced directive? no  Do you have a durable medical power of ? no  ACP document given to the patient? no      Review of Systems:     Review  of Systems   Constitutional:  Negative for chills and fever.   HENT:  Negative for congestion, ear pain, sinus pressure, sinus pain and sore throat.    Eyes:  Negative for pain and visual disturbance.   Respiratory:  Negative for cough and shortness of breath.    Cardiovascular:  Negative for chest pain and palpitations.   Gastrointestinal:  Negative for abdominal pain, constipation, diarrhea and vomiting.   Genitourinary:  Negative for dysuria and hematuria.   Musculoskeletal:  Negative for arthralgias and back pain.   Skin:  Negative for color change and rash.        Thinning hair   Neurological:  Negative for seizures and syncope.   All other systems reviewed and are negative.     Past Medical History:     Past Medical History:   Diagnosis Date    Prenatal care in third trimester 2023     (spontaneous vaginal delivery) 2023      Past Surgical History:     Past Surgical History:   Procedure Laterality Date    NO PAST SURGERIES        Social History:     Social History     Socioeconomic History    Marital status: /Civil Union     Spouse name: None    Number of children: None    Years of education: None    Highest education level: None   Occupational History    None   Tobacco Use    Smoking status: Never     Passive exposure: Never    Smokeless tobacco: Never   Vaping Use    Vaping status: Never Used   Substance and Sexual Activity    Alcohol use: Not Currently    Drug use: Never    Sexual activity: Yes     Partners: Male     Birth control/protection: None     Comment:    Other Topics Concern    None   Social History Narrative    None     Social Determinants of Health     Financial Resource Strain: Not on file   Food Insecurity: Not on file   Transportation Needs: Not on file   Physical Activity: Not on file   Stress: Not on file   Social Connections: Not on file   Intimate Partner Violence: Not on file   Housing Stability: Not on file      Family History:     Family History   Problem  "Relation Age of Onset    No Known Problems Mother     No Known Problems Father     No Known Problems Sister     No Known Problems Sister     No Known Problems Sister     No Known Problems Sister     No Known Problems Brother     No Known Problems Brother     No Known Problems Son     No Known Problems Son     Other Maternal Grandmother         MVA    Other Maternal Grandfather         MVA    Breast cancer Neg Hx     Colon cancer Neg Hx     Ovarian cancer Neg Hx       Current Medications:     Current Outpatient Medications   Medication Sig Dispense Refill    Calcium-Vitamin D 600-5 MG-MCG TABS Take by mouth      Prenatal Vit-Fe Fumarate-FA (PRENATAL VITAMIN PO) Take by mouth       Current Facility-Administered Medications   Medication Dose Route Frequency Provider Last Rate Last Admin    levonorgestrel (MIRENA) IUD 20 mcg/day  1 each Intrauterine Device Once every 8 years    1 Intra Uterine Device at 04/18/24 1539      Allergies:     No Known Allergies   Physical Exam:     /70 (BP Location: Left arm, Patient Position: Sitting)   Pulse 90   Temp 98.5 °F (36.9 °C) (Tympanic)   Ht 5' 2\" (1.575 m)   Wt 60.8 kg (134 lb)   LMP 04/11/2024 (Approximate)   SpO2 99%   BMI 24.51 kg/m²     Physical Exam  Vitals and nursing note reviewed.   Constitutional:       General: She is not in acute distress.     Appearance: She is well-developed.   HENT:      Head: Normocephalic and atraumatic.      Right Ear: Tympanic membrane normal. There is no impacted cerumen.      Left Ear: Tympanic membrane normal. There is no impacted cerumen.      Nose: No congestion or rhinorrhea.      Mouth/Throat:      Pharynx: No oropharyngeal exudate or posterior oropharyngeal erythema.   Eyes:      General:         Right eye: No discharge.         Left eye: No discharge.      Conjunctiva/sclera: Conjunctivae normal.   Cardiovascular:      Rate and Rhythm: Normal rate and regular rhythm.      Heart sounds: No murmur heard.  Pulmonary:      " Effort: Pulmonary effort is normal. No respiratory distress.      Breath sounds: Normal breath sounds.   Abdominal:      Palpations: Abdomen is soft.      Tenderness: There is no abdominal tenderness.   Musculoskeletal:         General: No swelling.      Cervical back: Neck supple.   Skin:     General: Skin is warm and dry.      Capillary Refill: Capillary refill takes less than 2 seconds.      Findings: No rash.      Comments: No rash no balding spots no scales hair is thin   Neurological:      Mental Status: She is alert and oriented to person, place, and time.   Psychiatric:         Mood and Affect: Mood normal.          Donavon Foster MD   [place] [facility]

## 2024-05-09 ENCOUNTER — APPOINTMENT (OUTPATIENT)
Dept: LAB | Facility: MEDICAL CENTER | Age: 29
End: 2024-05-09
Payer: COMMERCIAL

## 2024-05-09 DIAGNOSIS — Z00.01 ENCOUNTER FOR ROUTINE ADULT MEDICAL EXAM WITH ABNORMAL FINDINGS: ICD-10-CM

## 2024-05-09 DIAGNOSIS — Z13.29 SCREENING FOR THYROID DISORDER: ICD-10-CM

## 2024-05-09 DIAGNOSIS — L65.9 THINNING HAIR: ICD-10-CM

## 2024-05-09 DIAGNOSIS — Z13.220 SCREENING FOR LIPID DISORDERS: ICD-10-CM

## 2024-05-09 LAB
25(OH)D3 SERPL-MCNC: 25.4 NG/ML (ref 30–100)
ANA SER QL IA: NEGATIVE
ANION GAP SERPL CALCULATED.3IONS-SCNC: 9 MMOL/L (ref 4–13)
BASOPHILS # BLD AUTO: 0.03 THOUSANDS/ÂΜL (ref 0–0.1)
BASOPHILS NFR BLD AUTO: 1 % (ref 0–1)
BUN SERPL-MCNC: 13 MG/DL (ref 5–25)
CALCIUM SERPL-MCNC: 9.2 MG/DL (ref 8.4–10.2)
CHLORIDE SERPL-SCNC: 106 MMOL/L (ref 96–108)
CHOLEST SERPL-MCNC: 152 MG/DL
CO2 SERPL-SCNC: 25 MMOL/L (ref 21–32)
CREAT SERPL-MCNC: 0.42 MG/DL (ref 0.6–1.3)
EOSINOPHIL # BLD AUTO: 0.25 THOUSAND/ÂΜL (ref 0–0.61)
EOSINOPHIL NFR BLD AUTO: 5 % (ref 0–6)
ERYTHROCYTE [DISTWIDTH] IN BLOOD BY AUTOMATED COUNT: 12.3 % (ref 11.6–15.1)
FERRITIN SERPL-MCNC: 21 NG/ML (ref 11–307)
GFR SERPL CREATININE-BSD FRML MDRD: 139 ML/MIN/1.73SQ M
GLUCOSE P FAST SERPL-MCNC: 73 MG/DL (ref 65–99)
HCT VFR BLD AUTO: 40.5 % (ref 34.8–46.1)
HDLC SERPL-MCNC: 52 MG/DL
HGB BLD-MCNC: 13.4 G/DL (ref 11.5–15.4)
IMM GRANULOCYTES # BLD AUTO: 0.01 THOUSAND/UL (ref 0–0.2)
IMM GRANULOCYTES NFR BLD AUTO: 0 % (ref 0–2)
IRON SERPL-MCNC: 130 UG/DL (ref 50–212)
LDLC SERPL CALC-MCNC: 92 MG/DL (ref 0–100)
LYMPHOCYTES # BLD AUTO: 1.93 THOUSANDS/ÂΜL (ref 0.6–4.47)
LYMPHOCYTES NFR BLD AUTO: 37 % (ref 14–44)
MCH RBC QN AUTO: 30.8 PG (ref 26.8–34.3)
MCHC RBC AUTO-ENTMCNC: 33.1 G/DL (ref 31.4–37.4)
MCV RBC AUTO: 93 FL (ref 82–98)
MONOCYTES # BLD AUTO: 0.24 THOUSAND/ÂΜL (ref 0.17–1.22)
MONOCYTES NFR BLD AUTO: 5 % (ref 4–12)
NEUTROPHILS # BLD AUTO: 2.8 THOUSANDS/ÂΜL (ref 1.85–7.62)
NEUTS SEG NFR BLD AUTO: 52 % (ref 43–75)
NRBC BLD AUTO-RTO: 0 /100 WBCS
PLATELET # BLD AUTO: 257 THOUSANDS/UL (ref 149–390)
PMV BLD AUTO: 10.9 FL (ref 8.9–12.7)
POTASSIUM SERPL-SCNC: 4 MMOL/L (ref 3.5–5.3)
RBC # BLD AUTO: 4.35 MILLION/UL (ref 3.81–5.12)
SODIUM SERPL-SCNC: 140 MMOL/L (ref 135–147)
TRIGL SERPL-MCNC: 39 MG/DL
TSH SERPL DL<=0.05 MIU/L-ACNC: 2.28 UIU/ML (ref 0.45–4.5)
VIT B12 SERPL-MCNC: 303 PG/ML (ref 180–914)
WBC # BLD AUTO: 5.26 THOUSAND/UL (ref 4.31–10.16)

## 2024-05-09 PROCEDURE — 80048 BASIC METABOLIC PNL TOTAL CA: CPT

## 2024-05-09 PROCEDURE — 85025 COMPLETE CBC W/AUTO DIFF WBC: CPT

## 2024-05-09 PROCEDURE — 83540 ASSAY OF IRON: CPT

## 2024-05-09 PROCEDURE — 36415 COLL VENOUS BLD VENIPUNCTURE: CPT

## 2024-05-09 PROCEDURE — 82728 ASSAY OF FERRITIN: CPT

## 2024-05-09 PROCEDURE — 82306 VITAMIN D 25 HYDROXY: CPT

## 2024-05-09 PROCEDURE — 86038 ANTINUCLEAR ANTIBODIES: CPT

## 2024-05-09 PROCEDURE — 84443 ASSAY THYROID STIM HORMONE: CPT

## 2024-05-09 PROCEDURE — 80061 LIPID PANEL: CPT

## 2024-05-09 PROCEDURE — 82607 VITAMIN B-12: CPT

## 2024-05-10 ENCOUNTER — OFFICE VISIT (OUTPATIENT)
Dept: URGENT CARE | Facility: MEDICAL CENTER | Age: 29
End: 2024-05-10
Payer: COMMERCIAL

## 2024-05-10 ENCOUNTER — OFFICE VISIT (OUTPATIENT)
Dept: OBGYN CLINIC | Facility: MEDICAL CENTER | Age: 29
End: 2024-05-10
Payer: COMMERCIAL

## 2024-05-10 VITALS
RESPIRATION RATE: 18 BRPM | HEART RATE: 72 BPM | TEMPERATURE: 98.3 F | HEIGHT: 63 IN | BODY MASS INDEX: 24.03 KG/M2 | SYSTOLIC BLOOD PRESSURE: 103 MMHG | DIASTOLIC BLOOD PRESSURE: 73 MMHG | OXYGEN SATURATION: 100 % | WEIGHT: 135.6 LBS

## 2024-05-10 VITALS
HEIGHT: 63 IN | SYSTOLIC BLOOD PRESSURE: 100 MMHG | BODY MASS INDEX: 23.88 KG/M2 | WEIGHT: 134.8 LBS | DIASTOLIC BLOOD PRESSURE: 70 MMHG

## 2024-05-10 DIAGNOSIS — Z30.431 IUD CHECK UP: ICD-10-CM

## 2024-05-10 DIAGNOSIS — K12.30 STOMATITIS AND MUCOSITIS: Primary | ICD-10-CM

## 2024-05-10 DIAGNOSIS — K12.1 STOMATITIS AND MUCOSITIS: Primary | ICD-10-CM

## 2024-05-10 DIAGNOSIS — Z97.5 IUD (INTRAUTERINE DEVICE) IN PLACE: Primary | ICD-10-CM

## 2024-05-10 PROCEDURE — 99213 OFFICE O/P EST LOW 20 MIN: CPT | Performed by: OBSTETRICS & GYNECOLOGY

## 2024-05-10 PROCEDURE — 99213 OFFICE O/P EST LOW 20 MIN: CPT | Performed by: NURSE PRACTITIONER

## 2024-05-10 RX ORDER — LIDOCAINE HYDROCHLORIDE 20 MG/ML
15 SOLUTION OROPHARYNGEAL 4 TIMES DAILY PRN
Qty: 100 ML | Refills: 0 | Status: SHIPPED | OUTPATIENT
Start: 2024-05-10

## 2024-05-10 RX ORDER — TRIAMCINOLONE ACETONIDE 0.1 %
1 PASTE (GRAM) DENTAL 2 TIMES DAILY
Qty: 5 G | Refills: 0 | Status: SHIPPED | OUTPATIENT
Start: 2024-05-10

## 2024-05-10 NOTE — PROGRESS NOTES
"[facility] [address], [place], [place]    Assessment/Plan:  No problem-specific Assessment & Plan notes found for this encounter.    Diagnoses and all orders for this visit:    IUD (intrauterine device) in place    IUD check up          Subjective:   Kerri Wade is a 28 y.o.  female.  CC: IUD check    HPI: HPI  Patient presents for follow-up after IUD insertion last month.  She has not Mirena IUD in place.  She states her cycle started today.  She has been doing well.  She has been sexually active and no complaints regarding strings.  ROS: Review of Systems   Constitutional: Negative.    Respiratory: Negative.     Cardiovascular: Negative.    Gastrointestinal: Negative.    Genitourinary: Negative.    Musculoskeletal: Negative.    All other systems reviewed and are negative.      PFSH: The following portions of the patient's history were reviewed and updated as appropriate: allergies, current medications, past family history, past medical history, obstetric history, gynecologic history, past social history, past surgical history and problem list.       Objective:  /70   Ht 5' 3\" (1.6 m)   Wt 61.1 kg (134 lb 12.8 oz)   LMP 2024 (Approximate)   Breastfeeding Yes   BMI 23.88 kg/m²    Physical Exam  Vitals reviewed.   Constitutional:       Appearance: Normal appearance.   Cardiovascular:      Rate and Rhythm: Normal rate.   Pulmonary:      Effort: Pulmonary effort is normal. No respiratory distress.   Genitourinary:     General: Normal vulva.      Exam position: Lithotomy position.      Labia:         Right: No lesion.         Left: No lesion.       Vagina: Bleeding present.      Cervix: Normal.      Uterus: Normal.       Comments: IUD strings visualized  Neurological:      Mental Status: She is alert.   Psychiatric:         Mood and Affect: Mood normal.         Behavior: Behavior normal.         "

## 2024-05-10 NOTE — PATIENT INSTRUCTIONS
Oral Mucositis   WHAT YOU NEED TO KNOW:   Oral mucositis is inflammation in and around your mouth.   DISCHARGE INSTRUCTIONS:   Follow up with your healthcare provider as directed:  You may need to return for tests if your symptoms do not improve within 2 weeks. Write down your questions so you remember to ask them during your visits.   Risks:  You may lose weight because you are not able to eat. Oral mucositis may come back after treatment. Recurrent oral mucositis could cause scars that make it hard for you to chew or swallow . You could develop a life-threatening infection.   Manage your symptoms:  Oral mucositis usually gets better within 2 to 6 weeks. You can do the following to make your mouth feel better:  Do not smoke.  Smoking can make mouth sores worse. If you smoke, it is never too late to quit. Ask your healthcare provider for information if you need help quitting.    Eat soft, blended, moist foods.  Puddings, milkshakes, broths, soups, and cooked cereals are less likely to bother your mouth. Eat food that is lukewarm or cool.    Do not eat anything that could burn, sting, or scratch your mouth.  Examples are oranges, pineapples, hot peppers, potato chips, toast, and alcohol.    Take small bites, chew slowly, and sip water  while you eat. Rinse your mouth with water after meals.    Rinse with a baking soda or salt solution  to remove bacteria and food, and to prevent or treat mouth pain and sores.     Mix ½ teaspoon baking soda or salt with 8 ounces of warm water.    Rinse your mouth at least 4 to 6 times a day with this solution. You may rinse with plain water instead if it feels better to you.    Do not use store-bought mouthwash. It contains alcohol and other chemicals that can irritate your mouth.    Do not smoke:  Smoking can increase your risk for mouth sores or make them worse. If you smoke, it is never too late to quit. Ask your healthcare provider for information if you need help quitting.    Medicines:   Medicines  can help decrease pain or swelling in your mouth. You may also need medicine to prevent or treat an infection. Medicine may be given as a pill, an ointment, or a mouthwash.    Take your medicine as directed.  Contact your healthcare provider if you think your medicine is not helping or if you have side effects. Tell him if you are allergic to any medicine. Keep a list of the medicines, vitamins, and herbs you take. Include the amounts, and when and why you take them. Bring the list or the pill bottles to follow-up visits. Carry your medicine list with you in case of an emergency.    Prevent oral mucositis in the future:   Gently brush your teeth, gums, and tongue  after every meal and before bed. Use a soft toothbrush and plain fluoride toothpaste. Let your toothbrush air dry after each use to prevent bacteria growth. Replace your toothbrush often. Ask if it is safe to floss your teeth.     If you wear dentures, make sure they fit well.  Ask your dentist if your dentures can be refitted or if you need new dentures. Be extra careful when you put in or remove dentures. Try to prevent any injuries to your gums that could lead to sores or infection. Wear your dentures during the day only. Soak them in denture solution at night. Ask how to safely clean your gums.     Eat a variety of healthy foods.  Examples are fruits, vegetables, whole-grain breads, low-fat dairy products, beans, lean meats, and fish. Heathy foods give your body the vitamins and minerals it needs, and may prevent mucositis.     Avoid any food or drug that triggers mucositis.  Some fruit, nuts, shellfish, cinnamon, chewing gum, and toothpaste may trigger mucositis or other symptoms of an allergic reaction.    Contact your healthcare provider if:   You have a fever or chills.    Your symptoms do not get better within 2 weeks.    Your symptoms get worse, even after treatment.    You have questions or concerns about your condition  or care.    Seek care immediately or call 911:   Your heart is beating fast.    You have trouble breathing.    You cannot eat or drink.    © Copyright [employer] 2023 Information is for End User's use only and may not be sold, redistributed or otherwise used for commercial purposes.  The above information is an  only. It is not intended as medical advice for individual conditions or treatments. Talk to your doctor, nurse or pharmacist before following any medical regimen to see if it is safe and effective for you.

## 2024-05-10 NOTE — PROGRESS NOTES
[facility]        NAME: Kerri Wade is a 28 y.o. female  : 1995    MRN: [id number]  DATE: May 10, 2024  TIME: 10:50 AM    Assessment and Plan   Stomatitis and mucositis [K12.1, K12.30]  1. Stomatitis and mucositis  Lidocaine Viscous HCl (XYLOCAINE) 2 % mucosal solution    triamcinolone (KENALOG) 0.1 % oral topical paste        Currently breastfeeding - will avoid doxy at this time -   Start kenalog paste and viscous lido   F/u with pcp   Pt in agreement with plan of care    Patient Instructions     Follow up with PCP in 3-5 days.  Proceed to  ER if symptoms worsen.    Chief Complaint     Chief Complaint   Patient presents with    Mouth Lesions     Pt states she has had 4 mouth ulcer for the last 4 days.  The ulcers are very painful, pt is unable to drink or eat something due to the pain.           History of Present Illness   Kerri Wade presents to the clinic c/o    Mouth Lesions (Pt states she has had 4 mouth ulcer for the last 4 days.  The ulcers are very painful, pt is unable to drink or eat something due to the pain.  )  Pt states had this before - about last year - only had 2 and didn't hurt this bad.   Has not seen anyone else for it.   She is currently breastfeeding.        Review of Systems   Review of Systems   All other systems reviewed and are negative.        Current Medications     Long-Term Medications   Medication Sig Dispense Refill    triamcinolone (KENALOG) 0.1 % oral topical paste Apply 1 Application topically 2 (two) times a day 5 g 0       Current Allergies     Allergies as of 05/10/2024    (No Known Allergies)            The following portions of the patient's history were reviewed and updated as appropriate: allergies, current medications, past family history, past medical history, past social history, past surgical history and problem list.    Objective   /73 (BP Location: Right arm, Patient Position: Sitting)   Pulse 72   Temp 98.3 °F (36.8 °C) (Tympanic)   Resp  "18   Ht 5' 3\" (1.6 m)   Wt 61.5 kg (135 lb 9.6 oz)   LMP 04/11/2024 (Approximate)   SpO2 100%   BMI 24.02 kg/m²        Physical Exam     Physical Exam  Vitals and nursing note reviewed.   Constitutional:       Appearance: Normal appearance. She is well-developed.   HENT:      Head: Normocephalic and atraumatic.      Right Ear: Hearing, tympanic membrane, ear canal and external ear normal.      Left Ear: Hearing, tympanic membrane, ear canal and external ear normal.      Nose: Nose normal.      Mouth/Throat:      Lips: Pink.      Mouth: Mucous membranes are moist. Oral lesions present.      Pharynx: Oropharynx is clear.      Comments: 2 on inside of upper lip, 2 on inside of lower lip  Eyes:      General: Lids are normal.      Conjunctiva/sclera: Conjunctivae normal.      Pupils: Pupils are equal, round, and reactive to light.   Cardiovascular:      Rate and Rhythm: Normal rate and regular rhythm.      Heart sounds: Normal heart sounds, S1 normal and S2 normal.   Pulmonary:      Effort: Pulmonary effort is normal.      Breath sounds: Normal breath sounds.   Abdominal:      General: Abdomen is flat. Bowel sounds are normal.      Palpations: Abdomen is soft.   Musculoskeletal:         General: Normal range of motion.      Cervical back: Full passive range of motion without pain, normal range of motion and neck supple.   Skin:     General: Skin is warm and dry.   Neurological:      General: No focal deficit present.      Mental Status: She is alert and oriented to person, place, and time.   Psychiatric:         Mood and Affect: Mood normal.         Speech: Speech normal.         Behavior: Behavior normal. Behavior is cooperative.         Thought Content: Thought content normal.         Judgment: Judgment normal.                     "

## 2024-06-21 ENCOUNTER — OFFICE VISIT (OUTPATIENT)
Dept: FAMILY MEDICINE CLINIC | Facility: CLINIC | Age: 29
End: 2024-06-21
Payer: COMMERCIAL

## 2024-06-21 VITALS
DIASTOLIC BLOOD PRESSURE: 68 MMHG | TEMPERATURE: 98.5 F | WEIGHT: 132.4 LBS | SYSTOLIC BLOOD PRESSURE: 118 MMHG | BODY MASS INDEX: 23.46 KG/M2 | OXYGEN SATURATION: 98 % | HEIGHT: 63 IN | HEART RATE: 83 BPM

## 2024-06-21 DIAGNOSIS — E55.9 VITAMIN D DEFICIENCY: Primary | ICD-10-CM

## 2024-06-21 PROCEDURE — 99213 OFFICE O/P EST LOW 20 MIN: CPT | Performed by: FAMILY MEDICINE

## 2024-06-21 RX ORDER — MULTIVIT-MIN/IRON/FOLIC ACID/K 18-600-40
2000 CAPSULE ORAL DAILY
Qty: 30 CAPSULE | Refills: 3 | Status: SHIPPED | OUTPATIENT
Start: 2024-06-21

## 2024-06-21 NOTE — PROGRESS NOTES
"Ambulatory Visit  Name: Kerri Wade      : 1995      MRN: [id number]  Encounter Provider: Donavon Foster MD  Encounter Date: 2024   Encounter department: [facility]    Assessment & Plan   1. Vitamin D deficiency  Assessment & Plan:  New diagnosis finding on recent blood work discussed with patient to take vitamin D 2000 IU once a day vitamin D rich diet discussed with patient  Orders:  -     Vitamin D, Cholecalciferol, 50 MCG (2000 UT) CAPS; Take 2,000 Int'l Units by mouth daily       History of Present Illness     Patient here follow-up with a chronic condition recent blood work discussed with the patient no new concern        Review of Systems   Constitutional:  Negative for activity change, appetite change, fatigue and fever.   HENT:  Negative for congestion, ear pain, sinus pressure, sinus pain and sore throat.    Eyes:  Negative for pain, discharge, redness and itching.   Respiratory:  Negative for cough, chest tightness, shortness of breath and stridor.    Cardiovascular:  Negative for chest pain, palpitations and leg swelling.   Gastrointestinal:  Negative for abdominal pain, blood in stool, constipation, diarrhea and nausea.   Genitourinary:  Negative for dysuria, flank pain, frequency and hematuria.   Musculoskeletal:  Negative for back pain, joint swelling and neck pain.   Skin:  Negative for pallor and rash.   Neurological:  Negative for dizziness, tremors, weakness, numbness and headaches.   Hematological:  Does not bruise/bleed easily.       Objective     /68 (BP Location: Left arm, Patient Position: Sitting, Cuff Size: Standard)   Pulse 83   Temp 98.5 °F (36.9 °C) (Tympanic)   Ht 5' 3\" (1.6 m)   Wt 60.1 kg (132 lb 6.4 oz)   SpO2 98%   BMI 23.45 kg/m²     Physical Exam  Vitals and nursing note reviewed.   Constitutional:       General: She is not in acute distress.     Appearance: She is well-developed. She is not diaphoretic.   HENT:      " Head: Normocephalic.      Right Ear: Tympanic membrane and external ear normal.      Left Ear: Tympanic membrane and external ear normal.      Nose: No rhinorrhea.      Mouth/Throat:      Pharynx: No posterior oropharyngeal erythema.   Eyes:      General:         Right eye: No discharge.         Left eye: No discharge.      Conjunctiva/sclera: Conjunctivae normal.   Neck:      Vascular: No JVD.   Cardiovascular:      Rate and Rhythm: Normal rate and regular rhythm.      Heart sounds: Normal heart sounds. No murmur heard.     No gallop.   Pulmonary:      Effort: Pulmonary effort is normal. No respiratory distress.      Breath sounds: Normal breath sounds. No stridor. No wheezing or rales.   Chest:      Chest wall: No tenderness.   Abdominal:      General: There is no distension.      Palpations: Abdomen is soft. There is no mass.      Tenderness: There is no abdominal tenderness. There is no rebound.   Musculoskeletal:         General: No tenderness.      Cervical back: Normal range of motion and neck supple.   Lymphadenopathy:      Cervical: No cervical adenopathy.   Skin:     General: Skin is warm.      Findings: No erythema or rash.   Neurological:      Mental Status: She is alert and oriented to person, place, and time.       Administrative Statements

## 2024-06-23 PROBLEM — E55.9 VITAMIN D DEFICIENCY: Status: ACTIVE | Noted: 2024-06-23

## 2024-06-23 NOTE — ASSESSMENT & PLAN NOTE
New diagnosis finding on recent blood work discussed with patient to take vitamin D 2000 IU once a day vitamin D rich diet discussed with patient

## 2024-10-21 ENCOUNTER — OFFICE VISIT (OUTPATIENT)
Dept: FAMILY MEDICINE CLINIC | Facility: CLINIC | Age: 29
End: 2024-10-21
Payer: COMMERCIAL

## 2024-10-21 VITALS
OXYGEN SATURATION: 98 % | HEIGHT: 63 IN | SYSTOLIC BLOOD PRESSURE: 100 MMHG | BODY MASS INDEX: 23.04 KG/M2 | TEMPERATURE: 97 F | DIASTOLIC BLOOD PRESSURE: 60 MMHG | HEART RATE: 81 BPM | WEIGHT: 130 LBS

## 2024-10-21 DIAGNOSIS — L65.9 THINNING HAIR: Primary | ICD-10-CM

## 2024-10-21 DIAGNOSIS — E55.9 VITAMIN D DEFICIENCY: ICD-10-CM

## 2024-10-21 PROCEDURE — 99214 OFFICE O/P EST MOD 30 MIN: CPT | Performed by: FAMILY MEDICINE

## 2024-10-21 RX ORDER — MULTIVIT-MIN/IRON/FOLIC ACID/K 18-600-40
2000 CAPSULE ORAL DAILY
Qty: 30 CAPSULE | Refills: 3 | Status: SHIPPED | OUTPATIENT
Start: 2024-10-21

## 2024-10-21 NOTE — ASSESSMENT & PLAN NOTE
Symptomatic not responding to the vitamin supplement and no balding spot her hair is still thinning and falling down recommended to be evaluated by dermatology    Orders:    Ambulatory Referral to Dermatology; Future    CBC and differential; Future

## 2024-10-21 NOTE — PROGRESS NOTES
Ambulatory Visit  Name: Kerri Wade      : 1995      MRN: [id number]  Encounter Provider: Donavon Foster MD  Encounter Date: 10/21/2024   Encounter department: [facility]      Assessment & Plan  Thinning hair  Symptomatic not responding to the vitamin supplement and no balding spot her hair is still thinning and falling down recommended to be evaluated by dermatology    Orders:    Ambulatory Referral to Dermatology; Future    CBC and differential; Future    Vitamin D deficiency  Chronic continue vitamin D supplement due for vitamin D level before next appointment    Orders:    Vitamin D, Cholecalciferol, 50 MCG ( UT) CAPS; Take 2,000 Int'l Units by mouth daily    CBC and differential; Future    Vitamin D 25 hydroxy; Future         History of Present Illness     Patient here follow-up with a chronic condition patient who been complaining from seemingly she been taking vitamin D and biotin for the last 3 months and she did not notice any improvement she still have send here and losing hair a lot but no balding spot no rash no itchy patient history of vitamin D deficiency on vitamin D supplement tolerated well without side effect      Review of Systems   Constitutional:  Negative for chills and fever.   HENT:  Negative for ear pain and sore throat.    Eyes:  Negative for pain and visual disturbance.   Respiratory:  Negative for cough and shortness of breath.    Cardiovascular:  Negative for chest pain and palpitations.   Gastrointestinal:  Negative for abdominal pain, constipation, diarrhea and vomiting.   Genitourinary:  Negative for dysuria and hematuria.   Musculoskeletal:  Negative for arthralgias and back pain.   Skin:  Negative for color change and rash.        Thinning and  losing here   Neurological:  Negative for seizures and syncope.   All other systems reviewed and are negative.    Objective     /60 (BP Location: Left arm, Patient Position: Sitting)    "Pulse 81   Temp (!) 97 °F (36.1 °C) (Tympanic)   Ht 5' 2.5\" (1.588 m)   Wt 59 kg (130 lb)   LMP 10/20/2024 (Exact Date)   SpO2 98%   Breastfeeding Yes   BMI 23.40 kg/m²     Physical Exam  Vitals and nursing note reviewed.   Constitutional:       General: She is not in acute distress.     Appearance: She is well-developed. She is not diaphoretic.   HENT:      Head: Normocephalic.      Right Ear: Tympanic membrane and external ear normal.      Left Ear: Tympanic membrane and external ear normal.      Nose: No rhinorrhea.      Mouth/Throat:      Pharynx: No posterior oropharyngeal erythema.   Eyes:      General:         Right eye: No discharge.         Left eye: No discharge.      Conjunctiva/sclera: Conjunctivae normal.   Neck:      Vascular: No JVD.   Cardiovascular:      Rate and Rhythm: Normal rate and regular rhythm.      Heart sounds: Normal heart sounds. No murmur heard.     No gallop.   Pulmonary:      Effort: Pulmonary effort is normal. No respiratory distress.      Breath sounds: Normal breath sounds. No wheezing.   Abdominal:      General: There is no distension.      Palpations: Abdomen is soft.      Tenderness: There is no abdominal tenderness. There is no rebound.   Musculoskeletal:         General: No tenderness.      Cervical back: Normal range of motion and neck supple.   Lymphadenopathy:      Cervical: No cervical adenopathy.   Skin:     General: Skin is warm.      Findings: No erythema or rash.      Comments: Send here with no rash no balding spots   Neurological:      Mental Status: She is alert and oriented to person, place, and time.         Donavon Foster MD     "

## 2024-10-21 NOTE — ASSESSMENT & PLAN NOTE
Chronic continue vitamin D supplement due for vitamin D level before next appointment    Orders:    Vitamin D, Cholecalciferol, 50 MCG (2000 UT) CAPS; Take 2,000 Int'l Units by mouth daily    CBC and differential; Future    Vitamin D 25 hydroxy; Future

## 2024-10-22 ENCOUNTER — APPOINTMENT (OUTPATIENT)
Dept: LAB | Facility: MEDICAL CENTER | Age: 29
End: 2024-10-22
Payer: COMMERCIAL

## 2024-10-22 DIAGNOSIS — E55.9 VITAMIN D DEFICIENCY: ICD-10-CM

## 2024-10-22 DIAGNOSIS — L65.9 THINNING HAIR: ICD-10-CM

## 2024-10-22 LAB
25(OH)D3 SERPL-MCNC: 23.9 NG/ML (ref 30–100)
BASOPHILS # BLD AUTO: 0.03 THOUSANDS/ΜL (ref 0–0.1)
BASOPHILS NFR BLD AUTO: 0 % (ref 0–1)
EOSINOPHIL # BLD AUTO: 0.37 THOUSAND/ΜL (ref 0–0.61)
EOSINOPHIL NFR BLD AUTO: 6 % (ref 0–6)
ERYTHROCYTE [DISTWIDTH] IN BLOOD BY AUTOMATED COUNT: 12.4 % (ref 11.6–15.1)
HCT VFR BLD AUTO: 40 % (ref 34.8–46.1)
HGB BLD-MCNC: 12.9 G/DL (ref 11.5–15.4)
IMM GRANULOCYTES # BLD AUTO: 0.02 THOUSAND/UL (ref 0–0.2)
IMM GRANULOCYTES NFR BLD AUTO: 0 % (ref 0–2)
LYMPHOCYTES # BLD AUTO: 2.23 THOUSANDS/ΜL (ref 0.6–4.47)
LYMPHOCYTES NFR BLD AUTO: 33 % (ref 14–44)
MCH RBC QN AUTO: 30.8 PG (ref 26.8–34.3)
MCHC RBC AUTO-ENTMCNC: 32.3 G/DL (ref 31.4–37.4)
MCV RBC AUTO: 96 FL (ref 82–98)
MONOCYTES # BLD AUTO: 0.42 THOUSAND/ΜL (ref 0.17–1.22)
MONOCYTES NFR BLD AUTO: 6 % (ref 4–12)
NEUTROPHILS # BLD AUTO: 3.65 THOUSANDS/ΜL (ref 1.85–7.62)
NEUTS SEG NFR BLD AUTO: 55 % (ref 43–75)
NRBC BLD AUTO-RTO: 0 /100 WBCS
PLATELET # BLD AUTO: 269 THOUSANDS/UL (ref 149–390)
PMV BLD AUTO: 10.9 FL (ref 8.9–12.7)
RBC # BLD AUTO: 4.19 MILLION/UL (ref 3.81–5.12)
WBC # BLD AUTO: 6.72 THOUSAND/UL (ref 4.31–10.16)

## 2024-10-22 PROCEDURE — 82306 VITAMIN D 25 HYDROXY: CPT

## 2024-10-22 PROCEDURE — 85025 COMPLETE CBC W/AUTO DIFF WBC: CPT

## 2024-10-22 PROCEDURE — 36415 COLL VENOUS BLD VENIPUNCTURE: CPT

## 2024-11-19 ENCOUNTER — TELEPHONE (OUTPATIENT)
Age: 29
End: 2024-11-19

## 2024-11-19 NOTE — TELEPHONE ENCOUNTER
Received call from Patient to schedule New Patient appt for Skin Check - Hair loss. Scheduled 7/7/25 at 8:30 am with Dr. Ho in [place]. Verified insurance, provided [place] address.     Patient verbalized understanding.

## 2024-12-03 NOTE — TELEPHONE ENCOUNTER
[] [facility]  Outpatient Rehabilitation &  Therapy  [address]  [phone]  [phone] [] [facility]  Outpatient Rehabilitation &  Therapy  [address]  P: [phone]  F: [phone] [] [facility]  Outpatient Rehabilitation &  Therapy  [address]  P: [phone]  F: [phone] [x] [facility]  Outpatient Rehabilitation &  Therapy  [address]  [phone]  [phone] [] [facility]  Outpatient Rehabilitation &  Therapy  [address]   P: [phone]  F: [phone]  [] [facility]  Outpatient Rehabilitation &  Therapy  [address]  P: [phone]  F: [phone] [] [facility]  Outpatient Rehabilitation &  Therapy  [address]  P: [phone]  F: [phone] [] [facility]  Outpatient Rehabilitation &  Therapy  [address]  P: [phone]  F: [phone] [] [facility]  Outpatient Rehabilitation &  Therapy  [address]  P: [phone]  F: [phone]  [] [facility] Outpatient Rehabilitation &  Therapy  [address]  P: [phone]  F: [phone]     Physical Therapy Daily Treatment Note    Date:  12/3/2024  Patient Name:  Elton Shankar    :  2006  MRN: [id number]  Physician: Edd Conway PA-C                                  Insurance: [employer]; [place] yr; 75/75vs; no auth req; hard max; PT/OT/ST comb; 15% coins; 300/0 met ded; 3500/0 met OOP   Medical Diagnosis:   Grade 1 L  ankle sprain                      Rehab Codes: M25.562, M25.662  Onset date:    24                                    Next 's appt.:    Visit# / total visits: 3/12     Cancels/No Shows: 0/0    Subjective:    Pain:  [] Yes  [x] No  Location: L Ankle  Pain Rating: (0-10 scale) 0/10  Pain altered  OUTPATIENT CODE [id number] NO PRIOR AUTHORIZATION REQUIRED

## 2025-04-15 ENCOUNTER — OFFICE VISIT (OUTPATIENT)
Dept: URGENT CARE | Facility: MEDICAL CENTER | Age: 30
End: 2025-04-15
Payer: COMMERCIAL

## 2025-04-15 VITALS
TEMPERATURE: 98.4 F | SYSTOLIC BLOOD PRESSURE: 106 MMHG | RESPIRATION RATE: 16 BRPM | DIASTOLIC BLOOD PRESSURE: 76 MMHG | HEART RATE: 95 BPM | OXYGEN SATURATION: 97 %

## 2025-04-15 DIAGNOSIS — B96.89 BACTERIAL URI: Primary | ICD-10-CM

## 2025-04-15 DIAGNOSIS — J06.9 BACTERIAL URI: Primary | ICD-10-CM

## 2025-04-15 PROCEDURE — 99213 OFFICE O/P EST LOW 20 MIN: CPT

## 2025-04-15 RX ORDER — AZITHROMYCIN 250 MG/1
TABLET, FILM COATED ORAL
Qty: 6 TABLET | Refills: 0 | Status: SHIPPED | OUTPATIENT
Start: 2025-04-15 | End: 2025-04-19

## 2025-04-15 NOTE — PROGRESS NOTES
[facility]        NAME: Kerri Wade is a 29 y.o. female  : 1995    MRN: [id number]  DATE: April 15, 2025  TIME: 3:48 PM      Assessment and Plan     Bacterial URI [J06.9, B96.89]  1. Bacterial URI  azithromycin (ZITHROMAX) 250 mg tablet        Offered strep testing, declined.       Patient Instructions   Take antibiotic as prescribed. Recommend probiotic use while taking antibiotic.Recommend throat lozenges, anesthetic spray such as chloraseptic, and gargling warm salt water for throat irritation.   Mucinex OTC for cough and congestion.   Acetaminophen or ibuprofen for fever and pain. Follow-up with PCP in 3-5 days. Go to ER if symptoms worsen.       Chief Complaint     Chief Complaint   Patient presents with    Cold Like Symptoms     Patient reports sore throat, coughing, and headache for about a week. Also reports on and off fever         History of Present Illness     Patient is a 29-year-old female who presents with sore throat and cough for 1 week.  Reports intermittent fevers and chills.  Reports she is breast-feeding.  States her child is currently on antibiotics for an ear infection.        Review of Systems     Review of Systems   Constitutional:  Positive for chills and fever.   HENT:  Positive for sore throat.    Respiratory:  Positive for cough.    All other systems reviewed and are negative.        Current Medications       Current Outpatient Medications:     azithromycin (ZITHROMAX) 250 mg tablet, Take 2 tablets today then 1 tablet daily x 4 days, Disp: 6 tablet, Rfl: 0    Prenatal Vit-Fe Fumarate-FA (PRENATAL VITAMIN PO), Take by mouth, Disp: , Rfl:     Vitamin D, Cholecalciferol, 50 MCG ( UT) CAPS, Take 2,000 Int'l Units by mouth daily, Disp: 30 capsule, Rfl: 3    Calcium-Vitamin D 600-5 MG-MCG TABS, Take by mouth (Patient not taking: Reported on 4/15/2025), Disp: , Rfl:     Current Facility-Administered Medications:     levonorgestrel (MIRENA) IUD 20 mcg/day, 1 each,  Intrauterine Device, Once every 8 years, , 1 Intra Uterine Device at 24 1539    Current Allergies     Allergies as of 04/15/2025    (No Known Allergies)              The following portions of the patient's history were reviewed and updated as appropriate: allergies, current medications, past family history, past medical history, past social history, past surgical history and problem list.     Past Medical History:   Diagnosis Date    Prenatal care in third trimester 2023     (spontaneous vaginal delivery) 2023       Past Surgical History:   Procedure Laterality Date    NO PAST SURGERIES         Family History   Problem Relation Age of Onset    No Known Problems Mother     No Known Problems Father     No Known Problems Sister     No Known Problems Sister     No Known Problems Sister     No Known Problems Sister     No Known Problems Brother     No Known Problems Brother     No Known Problems Son     No Known Problems Son     Other Maternal Grandmother         MVA    Other Maternal Grandfather         MVA    Breast cancer Neg Hx     Colon cancer Neg Hx     Ovarian cancer Neg Hx          Medications have been verified.        Objective     /76   Pulse 95   Temp 98.4 °F (36.9 °C)   Resp 16   SpO2 97%   No LMP recorded.         Physical Exam     Physical Exam  Vitals and nursing note reviewed.   Constitutional:       General: She is awake. She is not in acute distress.     Appearance: Normal appearance. She is not ill-appearing, toxic-appearing or diaphoretic.   HENT:      Mouth/Throat:      Lips: Pink.      Mouth: Mucous membranes are moist.      Pharynx: Oropharynx is clear. Uvula midline. Posterior oropharyngeal erythema present. No pharyngeal swelling, oropharyngeal exudate or uvula swelling.   Cardiovascular:      Rate and Rhythm: Normal rate.      Pulses: Normal pulses.      Heart sounds: Normal heart sounds, S1 normal and S2 normal.   Pulmonary:      Effort: Pulmonary effort is  normal.      Breath sounds: Normal breath sounds and air entry.   Skin:     General: Skin is warm.      Capillary Refill: Capillary refill takes less than 2 seconds.   Neurological:      Mental Status: She is alert.   Psychiatric:         Mood and Affect: Mood normal.         Behavior: Behavior normal.         Thought Content: Thought content normal.         Judgment: Judgment normal.

## 2025-04-15 NOTE — PATIENT INSTRUCTIONS
Take antibiotic as prescribed. Recommend probiotic use while taking antibiotic.Recommend throat lozenges, anesthetic spray such as chloraseptic, and gargling warm salt water for throat irritation.   Mucinex OTC for cough and congestion.   Acetaminophen or ibuprofen for fever and pain. Follow-up with PCP in 3-5 days. Go to ER if symptoms worsen.

## 2025-05-05 NOTE — PATIENT INSTRUCTIONS
Problem: Adult Inpatient Plan of Care  Goal: Absence of Hospital-Acquired Illness or Injury  Intervention: Prevent Skin Injury  Recent Flowsheet Documentation  Taken 5/5/2025 0320 by Chip Brice, RN  Body Position:   weight shifting   turned   right  Taken 5/4/2025 2228 by Chip Brice, RN  Body Position: (pt declined repositioning) other (see comments)     Problem: Pain Acute  Goal: Optimal Pain Control and Function  Outcome: Not Progressing     Problem: Gas Exchange Impaired  Goal: Optimal Gas Exchange  Outcome: Not Progressing   Goal Outcome Evaluation:  Pt is alert and orientated but lethargic, able to make needs known. O2 needed to be bumped up to 2L NC overnight due to dip in sats. Pt was able to be repositioned once but declined other attempts. Good output from purewick overnight. Scheduled oxy and tylenol given for pain management. WOC to see her later today.                        Kick Counts in Pregnancy   WHAT YOU NEED TO KNOW:   Kick counts measure how much your baby is moving in your womb. A kick from your baby can be felt as a twist, turn, swish, roll, or jab. It is common to feel your baby kicking at 26 to 28 weeks of pregnancy. You may feel your baby kick as early as 20 weeks of pregnancy. You may want to start counting at 28 weeks. DISCHARGE INSTRUCTIONS:   Contact your doctor immediately if:   You feel a change in the number of kicks or movements of your baby. You feel fewer than 10 kicks within 2 hours. You have questions or concerns about your baby's movements. Why measure kick counts:  Your baby's movement may provide information about your baby's health. He or she may move less, or not at all, if there are problems. Your baby may move less if he or she is not getting enough oxygen or nutrition from the placenta. Do not smoke while you are pregnant. Smoking decreases the amount of oxygen that gets to your baby. Talk to your healthcare provider if you need help to quit smoking. Tell your healthcare provider as soon as you feel a change in your baby's movements. When to measure kick counts:   Measure kick counts at the same time every day. Measure kick counts when your baby is awake and most active. Your baby may be most active in the evening. How to measure kick counts:  Check that your baby is awake before you measure kick counts. You can wake up your baby by lightly pushing on your belly, walking, or drinking something cold. Your healthcare provider may tell you different ways to measure kick counts. You may be told to do the following:  Use a chart or clock to keep track of the time you start and finish counting. Sit in a chair or lie on your left side. Place your hands on the largest part of your belly. Count until you reach 10 kicks. Write down how much time it takes to count 10 kicks. It may take 30 minutes to 2 hours to count 10 kicks. It should not take more than 2 hours to count 10 kicks. Follow up with your doctor as directed:  Write down your questions so you remember to ask them during your visits. © Copyright [employer]gu[employer] 2023 Information is for End User's use only and may not be sold, redistributed or otherwise used for commercial purposes. The above information is an  only. It is not intended as medical advice for individual conditions or treatments. Talk to your doctor, nurse or pharmacist before following any medical regimen to see if it is safe and effective for you.

## 2025-05-09 ENCOUNTER — OFFICE VISIT (OUTPATIENT)
Dept: FAMILY MEDICINE CLINIC | Facility: CLINIC | Age: 30
End: 2025-05-09
Payer: COMMERCIAL

## 2025-05-09 VITALS
HEART RATE: 108 BPM | RESPIRATION RATE: 16 BRPM | SYSTOLIC BLOOD PRESSURE: 110 MMHG | DIASTOLIC BLOOD PRESSURE: 70 MMHG | BODY MASS INDEX: 23 KG/M2 | TEMPERATURE: 98.8 F | OXYGEN SATURATION: 98 % | HEIGHT: 62 IN | WEIGHT: 125 LBS

## 2025-05-09 DIAGNOSIS — Z13.6 SCREENING FOR CARDIOVASCULAR CONDITION: ICD-10-CM

## 2025-05-09 DIAGNOSIS — L50.9 URTICARIA: ICD-10-CM

## 2025-05-09 DIAGNOSIS — Z13.0 SCREENING FOR DEFICIENCY ANEMIA: ICD-10-CM

## 2025-05-09 DIAGNOSIS — J02.9 SORE THROAT: ICD-10-CM

## 2025-05-09 DIAGNOSIS — M89.9 BONE DISORDER: ICD-10-CM

## 2025-05-09 DIAGNOSIS — Z00.01 ENCOUNTER FOR ROUTINE ADULT MEDICAL EXAM WITH ABNORMAL FINDINGS: Primary | ICD-10-CM

## 2025-05-09 DIAGNOSIS — Z13.29 SCREENING FOR THYROID DISORDER: ICD-10-CM

## 2025-05-09 LAB — S PYO AG THROAT QL: NEGATIVE

## 2025-05-09 PROCEDURE — 99214 OFFICE O/P EST MOD 30 MIN: CPT | Performed by: FAMILY MEDICINE

## 2025-05-09 PROCEDURE — 87880 STREP A ASSAY W/OPTIC: CPT | Performed by: FAMILY MEDICINE

## 2025-05-09 PROCEDURE — 99395 PREV VISIT EST AGE 18-39: CPT | Performed by: FAMILY MEDICINE

## 2025-05-09 NOTE — PATIENT INSTRUCTIONS
"Patient Education     Routine physical for adults   The Basics   Written by the doctors and editors at [facility]   What is a physical? -- A physical is a routine visit, or \"check-up,\" with your doctor. You might also hear it called a \"wellness visit\" or \"preventive visit.\"  During each visit, the doctor will:   Ask about your physical and mental health   Ask about your habits, behaviors, and lifestyle   Do an exam   Give you vaccines if needed   Talk to you about any medicines you take   Give advice about your health   Answer your questions  Getting regular check-ups is an important part of taking care of your health. It can help your doctor find and treat any problems you have. But it's also important for preventing health problems.  A routine physical is different from a \"sick visit.\" A sick visit is when you see a doctor because of a health concern or problem. Since physicals are scheduled ahead of time, you can think about what you want to ask the doctor.  How often should I get a physical? -- It depends on your age and health. In general, for people age 21 years and older:   If you are younger than 50 years, you might be able to get a physical every 3 years.   If you are 50 years or older, your doctor might recommend a physical every year.  If you have an ongoing health condition, like diabetes or high blood pressure, your doctor will probably want to see you more often.  What happens during a physical? -- In general, each visit will include:   Physical exam - The doctor or nurse will check your height, weight, heart rate, and blood pressure. They will also look at your eyes and ears. They will ask about how you are feeling and whether you have any symptoms that bother you.   Medicines - It's a good idea to bring a list of all the medicines you take to each doctor visit. Your doctor will talk to you about your medicines and answer any questions. Tell them if you are having any side effects that bother you. You " "should also tell them if you are having trouble paying for any of your medicines.   Habits and behaviors - This includes:   Your diet   Your exercise habits   Whether you smoke, drink alcohol, or use drugs   Whether you are sexually active   Whether you feel safe at home  Your doctor will talk to you about things you can do to improve your health and lower your risk of health problems. They will also offer help and support. For example, if you want to quit smoking, they can give you advice and might prescribe medicines. If you want to improve your diet or get more physical activity, they can help you with this, too.   Lab tests, if needed - The tests you get will depend on your age and situation. For example, your doctor might want to check your:   Cholesterol   Blood sugar   Iron level   Vaccines - The recommended vaccines will depend on your age, health, and what vaccines you already had. Vaccines are very important because they can prevent certain serious or deadly infections.   Discussion of screening - \"Screening\" means checking for diseases or other health problems before they cause symptoms. Your doctor can recommend screening based on your age, risk, and preferences. This might include tests to check for:   Cancer, such as breast, prostate, cervical, ovarian, colorectal, prostate, lung, or skin cancer   Sexually transmitted infections, such as chlamydia and gonorrhea   Mental health conditions like depression and anxiety  Your doctor will talk to you about the different types of screening tests. They can help you decide which screenings to have. They can also explain what the results might mean.   Answering questions - The physical is a good time to ask the doctor or nurse questions about your health. If needed, they can refer you to other doctors or specialists, too.  Adults older than 65 years often need other care, too. As you get older, your doctor will talk to you about:   How to prevent falling at " home   Hearing or vision tests   Memory testing   How to take your medicines safely   Making sure that you have the help and support you need at home  All topics are updated as new evidence becomes available and our peer review process is complete.  This topic retrieved from [employer] on: May 02, 2024.  Topic 229223 Version 1.0  Release: 32.4.3 - C32.122  © 2024 UpToDate, Inc. and/or its affiliates. All rights reserved.  Consumer Information Use and Disclaimer   Disclaimer: This generalized information is a limited summary of diagnosis, treatment, and/or medication information. It is not meant to be comprehensive and should be used as a tool to help the user understand and/or assess potential diagnostic and treatment options. It does NOT include all information about conditions, treatments, medications, side effects, or risks that may apply to a specific patient. It is not intended to be medical advice or a substitute for the medical advice, diagnosis, or treatment of a health care provider based on the health care provider's examination and assessment of a patient's specific and unique circumstances. Patients must speak with a health care provider for complete information about their health, medical questions, and treatment options, including any risks or benefits regarding use of medications. This information does not endorse any treatments or medications as safe, effective, or approved for treating a specific patient. UpToDate, Inc. and its affiliates disclaim any warranty or liability relating to this information or the use thereof.The use of this information is governed by the Terms of Use, available at https://www.woltersMultispanuwer.com/en/know/clinical-effectiveness-terms. 2024© UpToDate, Inc. and its affiliates and/or licensors. All rights reserved.  Copyright   © 2024 UpToDate, Inc. and/or its affiliates. All rights reserved.

## 2025-05-09 NOTE — PROGRESS NOTES
Adult Annual Physical  Name: Kerri Wade      : 1995      MRN: [id number]  Encounter Provider: Donavon Foster MD  Encounter Date: 2025   Encounter department: [facility] PRIMARY CARE    :  Assessment & Plan  Encounter for routine adult medical exam with abnormal findings  Advice and education were given regarding nutrition, aerobic exercises, weight-bearing exercises, cardiovascular risk reduction, fall risk reduction, and age-appropriate supplements.     The patient was counseled regarding instructions for management, risk factor reductions, prognosis, risks and benefits of treatment options, patient and family education, and importance of compliance with treatment.       Urticaria  New diagnosis patient does not have a rash today but the picture over the phone has been reviewed with the patient recommend to do allergy testing respiratory and food allergy recommend to keep antihistamine handy at home to use it when needed proper use review  Orders:  •  [facility] Allergy Panel, Adult; Future  •  Food Allergy Profile; Future    Sore throat  New diagnosis rapid strep is negative recommend supportive treatment  Orders:  •  POCT rapid ANTIGEN strepA  •  [facility] Allergy Panel, Adult; Future  •  Food Allergy Profile; Future    Screening for deficiency anemia    Orders:  •  CBC and differential; Future    Screening for cardiovascular condition    Orders:  •  Comprehensive metabolic panel; Future  •  Lipid Panel with Direct LDL reflex; Future    Screening for thyroid disorder    Orders:  •  TSH, 3rd generation with Free T4 reflex; Future    Bone disorder    Orders:  •  Vitamin D 25 hydroxy; Future        Preventive Screenings:  - Diabetes Screening: screening up-to-date  - Cholesterol Screening: risks/benefits discussed   - Hepatitis C screening: screening up-to-date   - HIV screening: screening up-to-date   - Cervical cancer screening: screening up-to-date   - Lung cancer screening: screening  not indicated     Counseling/Anticipatory Guidance:    - Diet: discussed recommendations for a healthy/well-balanced diet.   - Exercise: the importance of regular exercise/physical activity was discussed. Recommend exercise 3-5 times per week for at least 30 minutes.       Depression Screening and Follow-up Plan: Patient was screened for depression during today's encounter. They screened negative with a PHQ-2 score of 0.          History of Present Illness   Patient here for well exam concerned about sore throat no fever no chills no decreased well intake no joint pain or swelling patient recently was in urgent care and diagnosed with bacterial URI given Zithromax she feels after the antibiotic did not get better symptom has been going on for the last 3 to 4 days patient also concerned about the rash she had a couple days ago and it is itchy and was in her trunk area pictures on her cell phone as she does not have clinically rash past medical surgical family and social history reviewed  Urgent care records reviewed with the patient    Adult Annual Physical:  Patient presents for annual physical.     Diet and Physical Activity:  - Diet/Nutrition: no special diet.  - Exercise: moderate cardiovascular exercise and 5-7 times a week on average.    Depression Screening:  - PHQ-2 Score: 0    General Health:  - Sleep: sleeps well and 4-6 hours of sleep on average.  - Hearing: normal hearing bilateral ears.  - Vision: no vision problems, wears glasses and most recent eye exam < 1 year ago.  - Dental: regular dental visits and brushes teeth twice daily.    /GYN Health:  - Follows with GYN: yes.   - History of STDs: no    Advanced Care Planning:  - Has an advanced directive?: no    - Has a durable medical POA?: no      Review of Systems   Constitutional:  Negative for chills and fever.   HENT:  Positive for sore throat. Negative for ear pain.    Eyes:  Negative for pain and visual disturbance.   Respiratory:  Negative for  "cough and shortness of breath.    Cardiovascular:  Negative for chest pain and palpitations.   Gastrointestinal:  Negative for abdominal pain, constipation, diarrhea and vomiting.   Genitourinary:  Negative for dysuria and hematuria.   Musculoskeletal:  Negative for arthralgias and back pain.   Skin:  Positive for rash. Negative for color change.   Neurological:  Negative for seizures and syncope.   All other systems reviewed and are negative.        Objective   /70   Pulse (!) 108   Temp 98.8 °F (37.1 °C) (Tympanic)   Resp 16   Ht 5' 2.21\" (1.58 m)   Wt 56.7 kg (125 lb)   SpO2 98%   BMI 22.71 kg/m²     Physical Exam  Vitals and nursing note reviewed.   Constitutional:       General: She is not in acute distress.     Appearance: She is well-developed. She is not diaphoretic.   HENT:      Head: Normocephalic.      Right Ear: Tympanic membrane and external ear normal.      Left Ear: Tympanic membrane and external ear normal.      Nose: No rhinorrhea.      Mouth/Throat:      Pharynx: No posterior oropharyngeal erythema.   Eyes:      General:         Right eye: No discharge.         Left eye: No discharge.      Conjunctiva/sclera: Conjunctivae normal.   Neck:      Vascular: No JVD.   Cardiovascular:      Rate and Rhythm: Normal rate and regular rhythm.      Heart sounds: Normal heart sounds. No murmur heard.     No gallop.   Pulmonary:      Effort: Pulmonary effort is normal. No respiratory distress.      Breath sounds: Normal breath sounds. No wheezing.   Abdominal:      General: There is no distension.      Palpations: Abdomen is soft.      Tenderness: There is no abdominal tenderness. There is no rebound.   Musculoskeletal:         General: No tenderness.      Cervical back: Normal range of motion and neck supple.   Lymphadenopathy:      Cervical: No cervical adenopathy.   Skin:     General: Skin is warm.      Findings: No rash.   Neurological:      Mental Status: She is alert and oriented to person, " place, and time.

## 2025-05-10 PROBLEM — J02.9 SORE THROAT: Status: ACTIVE | Noted: 2025-05-10

## 2025-05-10 PROBLEM — L50.9 URTICARIA: Status: ACTIVE | Noted: 2025-05-10

## 2025-05-10 NOTE — ASSESSMENT & PLAN NOTE
New diagnosis rapid strep is negative recommend supportive treatment  Orders:  •  POCT rapid ANTIGEN strepA  •  [facility] Allergy Panel, Adult; Future  •  Food Allergy Profile; Future

## 2025-05-10 NOTE — ASSESSMENT & PLAN NOTE
New diagnosis patient does not have a rash today but the picture over the phone has been reviewed with the patient recommend to do allergy testing respiratory and food allergy recommend to keep antihistamine handy at home to use it when needed proper use review  Orders:  •  [facility] Allergy Panel, Adult; Future  •  Food Allergy Profile; Future

## 2025-05-10 NOTE — ASSESSMENT & PLAN NOTE
Advice and education were given regarding nutrition, aerobic exercises, weight-bearing exercises, cardiovascular risk reduction, fall risk reduction, and age-appropriate supplements.     The patient was counseled regarding instructions for management, risk factor reductions, prognosis, risks and benefits of treatment options, patient and family education, and importance of compliance with treatment.

## 2025-05-15 ENCOUNTER — OFFICE VISIT (OUTPATIENT)
Dept: FAMILY MEDICINE CLINIC | Facility: CLINIC | Age: 30
End: 2025-05-15
Payer: COMMERCIAL

## 2025-05-15 VITALS
RESPIRATION RATE: 16 BRPM | OXYGEN SATURATION: 98 % | WEIGHT: 129 LBS | SYSTOLIC BLOOD PRESSURE: 110 MMHG | BODY MASS INDEX: 23.74 KG/M2 | TEMPERATURE: 97.3 F | HEART RATE: 85 BPM | DIASTOLIC BLOOD PRESSURE: 60 MMHG | HEIGHT: 62 IN

## 2025-05-15 DIAGNOSIS — R52 BODY ACHES: Primary | ICD-10-CM

## 2025-05-15 DIAGNOSIS — J35.8 TONSILLAR EXUDATE: ICD-10-CM

## 2025-05-15 DIAGNOSIS — J02.9 SORE THROAT: ICD-10-CM

## 2025-05-15 LAB
SARS-COV-2 AG UPPER RESP QL IA: NEGATIVE
SL AMB POCT RAPID FLU A: NEGATIVE
SL AMB POCT RAPID FLU B: NEGATIVE
VALID CONTROL: NORMAL

## 2025-05-15 PROCEDURE — 99213 OFFICE O/P EST LOW 20 MIN: CPT | Performed by: FAMILY MEDICINE

## 2025-05-15 PROCEDURE — 87811 SARS-COV-2 COVID19 W/OPTIC: CPT | Performed by: FAMILY MEDICINE

## 2025-05-15 PROCEDURE — 87804 INFLUENZA ASSAY W/OPTIC: CPT | Performed by: FAMILY MEDICINE

## 2025-05-15 RX ORDER — AZITHROMYCIN 250 MG/1
TABLET, FILM COATED ORAL
Qty: 6 TABLET | Refills: 0 | Status: SHIPPED | OUTPATIENT
Start: 2025-05-15 | End: 2025-05-20

## 2025-05-15 NOTE — PROGRESS NOTES
Name: Kerri Wade      : 1995      MRN: [id number]  Encounter Provider: Marcia French DO  Encounter Date: 5/15/2025   Encounter department: [facility] PRIMARY CARE  Chief Complaint   Patient presents with    Cold Like Symptoms     Body ache, sore throat started  - states had fever but doesn't know what it was.  taking otc tylenol, ibuprophen     Patient Instructions   Exposed to strep throat in mother-in-law. Start abx and gargle TID and take tylenol prn pain. Covid and flu test wnl today.     Assessment & Plan  Body aches  Tylenol prn pain   Orders:    POCT Rapid Covid Ag    POCT rapid flu A and B    Sore throat  Start abx and gargle tid and change toothbrush   Orders:    azithromycin (Zithromax) 250 mg tablet; Take 2 tablets (500 mg total) by mouth daily for 1 day, THEN 1 tablet (250 mg total) daily for 4 days.    Tonsillar exudate  Start abx and gargle TID and may use tylenol prn pain  Orders:    azithromycin (Zithromax) 250 mg tablet; Take 2 tablets (500 mg total) by mouth daily for 1 day, THEN 1 tablet (250 mg total) daily for 4 days.         History of Present Illness     Cold Like Symptoms (Body ache, sore throat started  - states had fever but doesn't know what it was.  taking otc tylenol, ibuprophen) Mother-in-law with strep throat. No other complaints. No rash and no abdominal pain.       Review of Systems   Constitutional: Negative.    HENT:  Positive for sore throat.    Eyes: Negative.    Respiratory: Negative.     Cardiovascular: Negative.    Gastrointestinal: Negative.    Endocrine: Negative.    Genitourinary: Negative.    Musculoskeletal:  Positive for myalgias.   Skin: Negative.    Allergic/Immunologic: Negative.    Neurological: Negative.    Hematological: Negative.    Psychiatric/Behavioral: Negative.       Past Medical History:   Diagnosis Date    Prenatal care in third trimester 2023     (spontaneous vaginal delivery) 2023     Past Surgical  "History:   Procedure Laterality Date    NO PAST SURGERIES       Family History   Problem Relation Age of Onset    No Known Problems Mother     No Known Problems Father     No Known Problems Sister     No Known Problems Sister     No Known Problems Sister     No Known Problems Sister     No Known Problems Brother     No Known Problems Brother     No Known Problems Son     No Known Problems Son     Other Maternal Grandmother         MVA    Other Maternal Grandfather         MVA    Breast cancer Neg Hx     Colon cancer Neg Hx     Ovarian cancer Neg Hx      Social History     Tobacco Use    Smoking status: Never     Passive exposure: Never    Smokeless tobacco: Never   Vaping Use    Vaping status: Never Used   Substance and Sexual Activity    Alcohol use: Never    Drug use: Never    Sexual activity: Yes     Partners: Male     Birth control/protection: I.U.D.     Comment: Sometimes It feela likethe that IUD may have been displaced     Medications[1]  No Known Allergies  Immunization History   Administered Date(s) Administered    COVID-19 PFIZER VACCINE 0.3 ML IM 04/12/2021, 05/01/2021, 11/23/2021    INFLUENZA 10/04/2021, 10/18/2022, 10/07/2024    Respiratory Syncytial Virus Vaccine (Recombinant) 11/21/2023    Tdap 04/05/2019, 11/07/2023     Objective   /60   Pulse 85   Temp (!) 97.3 °F (36.3 °C) (Temporal)   Resp 16   Ht 5' 2.21\" (1.58 m)   Wt 58.5 kg (129 lb)   SpO2 98%   BMI 23.43 kg/m²     Physical Exam  Constitutional:       Appearance: She is well-developed.   HENT:      Head: Normocephalic and atraumatic.      Right Ear: External ear normal.      Left Ear: External ear normal.      Nose: Nose normal.      Mouth/Throat:      Mouth: Mucous membranes are moist.      Pharynx: Oropharyngeal exudate (left mild tonsillar exudate) present.     Eyes:      Conjunctiva/sclera: Conjunctivae normal.      Pupils: Pupils are equal, round, and reactive to light.       Cardiovascular:      Rate and Rhythm: Normal rate " and regular rhythm.      Pulses: Normal pulses.      Heart sounds: Normal heart sounds.   Pulmonary:      Effort: Pulmonary effort is normal.      Breath sounds: Normal breath sounds.     Musculoskeletal:         General: Normal range of motion.      Cervical back: Normal range of motion and neck supple.     Skin:     General: Skin is warm and dry.      Capillary Refill: Capillary refill takes less than 2 seconds.     Neurological:      General: No focal deficit present.      Mental Status: She is alert and oriented to person, place, and time. Mental status is at baseline.      Deep Tendon Reflexes: Reflexes are normal and symmetric.     Psychiatric:         Mood and Affect: Mood normal.         Behavior: Behavior normal.         Thought Content: Thought content normal.         Judgment: Judgment normal.       Administrative Statements   I have spent a total time of 25 minutes in caring for this patient on the day of the visit/encounter including Diagnostic results, Prognosis, Risks and benefits of tx options, Instructions for management, Patient and family education, Importance of tx compliance, Risk factor reductions, Impressions, Counseling / Coordination of care, Documenting in the medical record, Reviewing/placing orders in the medical record (including tests, medications, and/or procedures), and Obtaining or reviewing history  .       [1]   Current Outpatient Medications on File Prior to Visit   Medication Sig    Prenatal Vit-Fe Fumarate-FA (PRENATAL VITAMIN PO) Take by mouth    Vitamin D, Cholecalciferol, 50 MCG (2000 UT) CAPS Take 2,000 Int'l Units by mouth daily    Calcium-Vitamin D 600-5 MG-MCG TABS Take by mouth (Patient not taking: Reported on 4/15/2025)

## 2025-05-15 NOTE — ASSESSMENT & PLAN NOTE
Start abx and gargle tid and change toothbrush   Orders:    azithromycin (Zithromax) 250 mg tablet; Take 2 tablets (500 mg total) by mouth daily for 1 day, THEN 1 tablet (250 mg total) daily for 4 days.

## 2025-05-15 NOTE — PATIENT INSTRUCTIONS
Exposed to strep throat in mother-in-law. Start abx and gargle TID and take tylenol prn pain. Covid and flu test wnl today.

## 2025-05-28 ENCOUNTER — APPOINTMENT (OUTPATIENT)
Dept: LAB | Facility: MEDICAL CENTER | Age: 30
End: 2025-05-28
Payer: COMMERCIAL

## 2025-05-28 DIAGNOSIS — J02.9 SORE THROAT: ICD-10-CM

## 2025-05-28 DIAGNOSIS — Z13.29 SCREENING FOR THYROID DISORDER: ICD-10-CM

## 2025-05-28 DIAGNOSIS — Z13.6 SCREENING FOR CARDIOVASCULAR CONDITION: ICD-10-CM

## 2025-05-28 DIAGNOSIS — Z13.0 SCREENING FOR DEFICIENCY ANEMIA: ICD-10-CM

## 2025-05-28 DIAGNOSIS — M89.9 BONE DISORDER: ICD-10-CM

## 2025-05-28 DIAGNOSIS — L50.9 URTICARIA: ICD-10-CM

## 2025-05-28 LAB
25(OH)D3 SERPL-MCNC: 28.5 NG/ML (ref 30–100)
ALBUMIN SERPL BCG-MCNC: 4.4 G/DL (ref 3.5–5)
ALP SERPL-CCNC: 83 U/L (ref 34–104)
ALT SERPL W P-5'-P-CCNC: 16 U/L (ref 7–52)
ANION GAP SERPL CALCULATED.3IONS-SCNC: 9 MMOL/L (ref 4–13)
AST SERPL W P-5'-P-CCNC: 19 U/L (ref 13–39)
BASOPHILS # BLD AUTO: 0.05 THOUSANDS/ÂΜL (ref 0–0.1)
BASOPHILS NFR BLD AUTO: 1 % (ref 0–1)
BILIRUB SERPL-MCNC: 0.59 MG/DL (ref 0.2–1)
BUN SERPL-MCNC: 17 MG/DL (ref 5–25)
CALCIUM SERPL-MCNC: 9.6 MG/DL (ref 8.4–10.2)
CHLORIDE SERPL-SCNC: 104 MMOL/L (ref 96–108)
CHOLEST SERPL-MCNC: 144 MG/DL (ref ?–200)
CO2 SERPL-SCNC: 28 MMOL/L (ref 21–32)
CREAT SERPL-MCNC: 0.45 MG/DL (ref 0.6–1.3)
EOSINOPHIL # BLD AUTO: 0.28 THOUSAND/ÂΜL (ref 0–0.61)
EOSINOPHIL NFR BLD AUTO: 4 % (ref 0–6)
ERYTHROCYTE [DISTWIDTH] IN BLOOD BY AUTOMATED COUNT: 12.2 % (ref 11.6–15.1)
GFR SERPL CREATININE-BSD FRML MDRD: 135 ML/MIN/1.73SQ M
GLUCOSE P FAST SERPL-MCNC: 69 MG/DL (ref 65–99)
HCT VFR BLD AUTO: 42 % (ref 34.8–46.1)
HDLC SERPL-MCNC: 51 MG/DL
HGB BLD-MCNC: 13.3 G/DL (ref 11.5–15.4)
IMM GRANULOCYTES # BLD AUTO: 0.01 THOUSAND/UL (ref 0–0.2)
IMM GRANULOCYTES NFR BLD AUTO: 0 % (ref 0–2)
LDLC SERPL CALC-MCNC: 78 MG/DL (ref 0–100)
LYMPHOCYTES # BLD AUTO: 1.92 THOUSANDS/ÂΜL (ref 0.6–4.47)
LYMPHOCYTES NFR BLD AUTO: 28 % (ref 14–44)
MCH RBC QN AUTO: 30.4 PG (ref 26.8–34.3)
MCHC RBC AUTO-ENTMCNC: 31.7 G/DL (ref 31.4–37.4)
MCV RBC AUTO: 96 FL (ref 82–98)
MONOCYTES # BLD AUTO: 0.31 THOUSAND/ÂΜL (ref 0.17–1.22)
MONOCYTES NFR BLD AUTO: 4 % (ref 4–12)
NEUTROPHILS # BLD AUTO: 4.41 THOUSANDS/ÂΜL (ref 1.85–7.62)
NEUTS SEG NFR BLD AUTO: 63 % (ref 43–75)
NRBC BLD AUTO-RTO: 0 /100 WBCS
PLATELET # BLD AUTO: 290 THOUSANDS/UL (ref 149–390)
PMV BLD AUTO: 10.5 FL (ref 8.9–12.7)
POTASSIUM SERPL-SCNC: 3.7 MMOL/L (ref 3.5–5.3)
PROT SERPL-MCNC: 7.3 G/DL (ref 6.4–8.4)
RBC # BLD AUTO: 4.38 MILLION/UL (ref 3.81–5.12)
SODIUM SERPL-SCNC: 141 MMOL/L (ref 135–147)
TRIGL SERPL-MCNC: 77 MG/DL (ref ?–150)
TSH SERPL DL<=0.05 MIU/L-ACNC: 1.37 UIU/ML (ref 0.45–4.5)
WBC # BLD AUTO: 6.98 THOUSAND/UL (ref 4.31–10.16)

## 2025-05-28 PROCEDURE — 36415 COLL VENOUS BLD VENIPUNCTURE: CPT

## 2025-05-28 PROCEDURE — 84443 ASSAY THYROID STIM HORMONE: CPT

## 2025-05-28 PROCEDURE — 82306 VITAMIN D 25 HYDROXY: CPT

## 2025-05-28 PROCEDURE — 85025 COMPLETE CBC W/AUTO DIFF WBC: CPT

## 2025-05-28 PROCEDURE — 86003 ALLG SPEC IGE CRUDE XTRC EA: CPT

## 2025-05-28 PROCEDURE — 82785 ASSAY OF IGE: CPT

## 2025-05-28 PROCEDURE — 80053 COMPREHEN METABOLIC PANEL: CPT

## 2025-05-28 PROCEDURE — 80061 LIPID PANEL: CPT

## 2025-05-29 LAB
A ALTERNATA IGE QN: <0.1 KUA/I (ref 0–0.1)
A FUMIGATUS IGE QN: <0.1 KUA/I (ref 0–0.1)
ALMOND IGE QN: <0.1 KUA/I (ref 0–0.1)
BERMUDA GRASS IGE QN: <0.1 KUA/I (ref 0–0.1)
BOXELDER IGE QN: <0.1 KUA/I (ref 0–0.1)
C HERBARUM IGE QN: <0.1 KUA/I (ref 0–0.1)
CASHEW NUT IGE QN: <0.1 KUA/I (ref 0–0.1)
CAT DANDER IGE QN: <0.1 KUA/I (ref 0–0.1)
CMN PIGWEED IGE QN: 0.35 KUA/I (ref 0–0.1)
CODFISH IGE QN: <0.1 KUA/I (ref 0–0.1)
COMMON RAGWEED IGE QN: <0.1 KUA/I (ref 0–0.1)
COTTONWOOD IGE QN: 0.23 KUA/I (ref 0–0.1)
D FARINAE IGE QN: 0.24 KUA/I (ref 0–0.1)
D PTERONYSS IGE QN: 0.27 KUA/I (ref 0–0.1)
DOG DANDER IGE QN: <0.1 KUA/I (ref 0–0.1)
EGG WHITE IGE QN: <0.1 KUA/I (ref 0–0.1)
GLUTEN IGE QN: <0.1 KUA/I (ref 0–0.1)
HAZELNUT IGE QN: <0.1 KUA/L (ref 0–0.1)
LONDON PLANE IGE QN: <0.1 KUA/I (ref 0–0.1)
MILK IGE QN: <0.1 KUA/I (ref 0–0.1)
MOUSE URINE PROT IGE QN: <0.1 KUA/I (ref 0–0.1)
MT JUNIPER IGE QN: <0.1 KUA/I (ref 0–0.1)
MUGWORT IGE QN: <0.1 KUA/I (ref 0–0.1)
P NOTATUM IGE QN: <0.1 KUA/I (ref 0–0.1)
PEANUT IGE QN: <0.1 KUA/I (ref 0–0.1)
ROACH IGE QN: 0.2 KUA/I (ref 0–0.1)
SALMON IGE QN: <0.1 KUA/I (ref 0–0.1)
SCALLOP IGE QN: <0.1 KUA/L (ref 0–0.1)
SESAME SEED IGE QN: <0.1 KUA/I (ref 0–0.1)
SHEEP SORREL IGE QN: <0.1 KUA/I (ref 0–0.1)
SHRIMP IGE QN: <0.1 KUA/L (ref 0–0.1)
SILVER BIRCH IGE QN: <0.1 KUA/I (ref 0–0.1)
SOYBEAN IGE QN: <0.1 KUA/I (ref 0–0.1)
TIMOTHY IGE QN: <0.1 KUA/I (ref 0–0.1)
TOTAL IGE SMQN RAST: 233 KU/L (ref 0–113)
TOTAL IGE SMQN RAST: 239 KU/L (ref 0–113)
TUNA IGE QN: <0.1 KUA/I (ref 0–0.1)
WALNUT IGE QN: 0.13 KUA/I (ref 0–0.1)
WALNUT IGE QN: <0.1 KUA/I (ref 0–0.1)
WHEAT IGE QN: <0.1 KUA/I (ref 0–0.1)
WHITE ASH IGE QN: 0.32 KUA/I (ref 0–0.1)
WHITE ELM IGE QN: 0.58 KUA/I (ref 0–0.1)
WHITE MULBERRY IGE QN: <0.1 KUA/I (ref 0–0.1)
WHITE OAK IGE QN: <0.1 KUA/I (ref 0–0.1)

## 2025-06-09 ENCOUNTER — OFFICE VISIT (OUTPATIENT)
Dept: FAMILY MEDICINE CLINIC | Facility: CLINIC | Age: 30
End: 2025-06-09
Payer: COMMERCIAL

## 2025-06-09 VITALS
SYSTOLIC BLOOD PRESSURE: 116 MMHG | DIASTOLIC BLOOD PRESSURE: 74 MMHG | HEART RATE: 97 BPM | TEMPERATURE: 98.3 F | BODY MASS INDEX: 22.68 KG/M2 | WEIGHT: 128 LBS | HEIGHT: 63 IN | OXYGEN SATURATION: 98 %

## 2025-06-09 DIAGNOSIS — E55.9 VITAMIN D DEFICIENCY: Primary | ICD-10-CM

## 2025-06-09 PROCEDURE — 99213 OFFICE O/P EST LOW 20 MIN: CPT | Performed by: FAMILY MEDICINE

## 2025-06-09 RX ORDER — MULTIVIT-MIN/IRON/FOLIC ACID/K 18-600-40
2000 CAPSULE ORAL 2 TIMES DAILY
Qty: 60 CAPSULE | Refills: 3 | Status: SHIPPED | OUTPATIENT
Start: 2025-06-09

## 2025-06-09 NOTE — PROGRESS NOTES
"Name: Kerri Wade      : 1995      MRN: [id number]  Encounter Provider: Donavon Foster MD  Encounter Date: 2025   Encounter department: [facility] PRIMARY CARE  :  Assessment & Plan  Vitamin D deficiency  Chronic asymptomatic uncontrolled increase vitamin D to 2002 tablet daily proper use reviewed vitamin D rich diet review  Orders:  •  Vitamin D, Cholecalciferol, 50 MCG (2000 UT) CAPS; Take 2,000 Int'l Units by mouth in the morning and 2,000 Int'l Units before bedtime.      Assessment & Plan           History of Present Illness   Patient is here f/up with chronic condition ,complaint with med tolerated well no new concern recent blood work reviewed       Review of Systems   Constitutional:  Negative for activity change, appetite change, fatigue and fever.   HENT:  Negative for congestion, ear pain, sinus pressure, sinus pain and sore throat.    Eyes:  Negative for discharge and redness.   Respiratory:  Negative for cough, chest tightness and shortness of breath.    Cardiovascular:  Negative for chest pain, palpitations and leg swelling.   Gastrointestinal:  Negative for abdominal pain, constipation and diarrhea.   Genitourinary:  Negative for dysuria, flank pain, frequency and hematuria.   Musculoskeletal:  Negative for gait problem.   Skin:  Negative for pallor and rash.   Neurological:  Negative for dizziness, tremors, weakness, numbness and headaches.   Hematological:  Does not bruise/bleed easily.       Objective   /74   Pulse 97   Temp 98.3 °F (36.8 °C) (Tympanic)   Ht 5' 2.99\" (1.6 m)   Wt 58.1 kg (128 lb)   SpO2 98%   BMI 22.68 kg/m²      Physical Exam  Vitals and nursing note reviewed.   Constitutional:       General: She is not in acute distress.     Appearance: She is well-developed. She is not diaphoretic.   HENT:      Head: Normocephalic.      Right Ear: Tympanic membrane and external ear normal.      Left Ear: Tympanic membrane and external ear normal.      Nose: " No rhinorrhea.      Mouth/Throat:      Pharynx: No posterior oropharyngeal erythema.     Eyes:      General:         Right eye: No discharge.         Left eye: No discharge.      Conjunctiva/sclera: Conjunctivae normal.     Neck:      Vascular: No JVD.     Cardiovascular:      Rate and Rhythm: Normal rate and regular rhythm.      Heart sounds: Normal heart sounds. No murmur heard.     No gallop.   Pulmonary:      Effort: Pulmonary effort is normal. No respiratory distress.      Breath sounds: Normal breath sounds. No wheezing.   Abdominal:      General: There is no distension.      Palpations: Abdomen is soft.      Tenderness: There is no abdominal tenderness. There is no rebound.     Musculoskeletal:         General: No tenderness.      Cervical back: Normal range of motion and neck supple.   Lymphadenopathy:      Cervical: No cervical adenopathy.     Skin:     General: Skin is warm.      Findings: No rash.     Neurological:      Mental Status: She is alert and oriented to person, place, and time.

## 2025-06-09 NOTE — ASSESSMENT & PLAN NOTE
Chronic asymptomatic uncontrolled increase vitamin D to 2002 tablet daily proper use reviewed vitamin D rich diet review  Orders:  •  Vitamin D, Cholecalciferol, 50 MCG (2000 UT) CAPS; Take 2,000 Int'l Units by mouth in the morning and 2,000 Int'l Units before bedtime.

## 2025-07-01 DIAGNOSIS — E55.9 VITAMIN D DEFICIENCY: ICD-10-CM

## 2025-07-02 RX ORDER — ACETAMINOPHEN 160 MG
TABLET,DISINTEGRATING ORAL
Qty: 180 CAPSULE | Refills: 2 | Status: SHIPPED | OUTPATIENT
Start: 2025-07-02

## 2025-07-02 NOTE — TELEPHONE ENCOUNTER
Requested medication(s) are due for refill today: If no, explain: sent 6/9/25 - PATIENT REQUESTING 90 DAY SUPPLY  **If antibiotic or given during sick visit, contact patient to discuss current symptoms.   **Confirm prescribing provider    LOV:  6/9/25  **If longer then 1 year, contact patient to schedule annual PRIOR to refilling. Once scheduled, adjust refill for 30 days, no refills.  **Update CareEverywhere to confirm not being seen elsewhere    NOV:  12/9/25    Is patient due for annual visit: No  **If future appointment, adjust to annual/follow up.  ** No appointment call to schedule annual/follow up.    Route to PCP, unless PCP no longer here, then physician they are seeing next.

## 2025-07-07 ENCOUNTER — TELEPHONE (OUTPATIENT)
Dept: DERMATOLOGY | Facility: CLINIC | Age: 30
End: 2025-07-07

## 2025-07-07 NOTE — TELEPHONE ENCOUNTER
Patient was a no show for her appt this morning with Dr. Ho in [place]. Sent letter via [employer] advising pt to call the office if she would like to r/s this appointment.